# Patient Record
Sex: MALE | Race: OTHER | NOT HISPANIC OR LATINO | Employment: UNEMPLOYED | URBAN - METROPOLITAN AREA
[De-identification: names, ages, dates, MRNs, and addresses within clinical notes are randomized per-mention and may not be internally consistent; named-entity substitution may affect disease eponyms.]

---

## 2019-07-29 ENCOUNTER — APPOINTMENT (INPATIENT)
Dept: RADIOLOGY | Facility: HOSPITAL | Age: 49
DRG: 280 | End: 2019-07-29
Payer: COMMERCIAL

## 2019-07-29 ENCOUNTER — HOSPITAL ENCOUNTER (INPATIENT)
Facility: HOSPITAL | Age: 49
LOS: 3 days | Discharge: LEFT BEFORE DISCHARGE | DRG: 280 | End: 2019-08-01
Attending: EMERGENCY MEDICINE | Admitting: INTERNAL MEDICINE
Payer: COMMERCIAL

## 2019-07-29 ENCOUNTER — APPOINTMENT (EMERGENCY)
Dept: CT IMAGING | Facility: HOSPITAL | Age: 49
DRG: 280 | End: 2019-07-29
Payer: COMMERCIAL

## 2019-07-29 DIAGNOSIS — F10.10 ALCOHOL ABUSE: ICD-10-CM

## 2019-07-29 DIAGNOSIS — N17.9 AKI (ACUTE KIDNEY INJURY) (HCC): ICD-10-CM

## 2019-07-29 DIAGNOSIS — K70.10 ALCOHOLIC HEPATITIS: ICD-10-CM

## 2019-07-29 DIAGNOSIS — R74.01 TRANSAMINITIS: ICD-10-CM

## 2019-07-29 DIAGNOSIS — K92.0 HEMATEMESIS: ICD-10-CM

## 2019-07-29 DIAGNOSIS — E80.6 DIRECT HYPERBILIRUBINEMIA: ICD-10-CM

## 2019-07-29 DIAGNOSIS — D72.829 LEUKOCYTOSIS: ICD-10-CM

## 2019-07-29 DIAGNOSIS — K72.00 ACUTE LIVER FAILURE WITHOUT HEPATIC COMA: Primary | ICD-10-CM

## 2019-07-29 DIAGNOSIS — E72.20 HYPERAMMONEMIA (HCC): ICD-10-CM

## 2019-07-29 DIAGNOSIS — D64.9 ANEMIA, UNSPECIFIED TYPE: ICD-10-CM

## 2019-07-29 DIAGNOSIS — K92.0 HEMATEMESIS WITH NAUSEA: ICD-10-CM

## 2019-07-29 DIAGNOSIS — D68.9 COAGULOPATHY (HCC): ICD-10-CM

## 2019-07-29 DIAGNOSIS — E87.1 HYPONATREMIA: ICD-10-CM

## 2019-07-29 DIAGNOSIS — N19 RENAL FAILURE, UNSPECIFIED CHRONICITY: ICD-10-CM

## 2019-07-29 DIAGNOSIS — G93.40 ENCEPHALOPATHY: ICD-10-CM

## 2019-07-29 DIAGNOSIS — K92.0 COFFEE GROUND EMESIS: ICD-10-CM

## 2019-07-29 PROBLEM — R18.8 CIRRHOSIS OF LIVER WITH ASCITES (HCC): Status: ACTIVE | Noted: 2019-07-29

## 2019-07-29 PROBLEM — K70.30 ALCOHOLIC CIRRHOSIS (HCC): Status: ACTIVE | Noted: 2019-07-29

## 2019-07-29 PROBLEM — R06.02 SOB (SHORTNESS OF BREATH): Status: ACTIVE | Noted: 2019-07-29

## 2019-07-29 PROBLEM — R79.89 ELEVATED SERUM CREATININE: Status: ACTIVE | Noted: 2019-07-29

## 2019-07-29 PROBLEM — R55 SYNCOPE: Status: ACTIVE | Noted: 2019-07-29

## 2019-07-29 PROBLEM — K74.60 CIRRHOSIS OF LIVER WITH ASCITES (HCC): Status: ACTIVE | Noted: 2019-07-29

## 2019-07-29 LAB
ALBUMIN SERPL BCP-MCNC: 2.2 G/DL (ref 3.5–5)
ALP SERPL-CCNC: 327 U/L (ref 46–116)
ALT SERPL W P-5'-P-CCNC: 70 U/L (ref 12–78)
AMMONIA PLAS-SCNC: 73 UMOL/L (ref 11–35)
ANION GAP SERPL CALCULATED.3IONS-SCNC: 17 MMOL/L (ref 4–13)
APTT PPP: 34 SECONDS (ref 23–37)
AST SERPL W P-5'-P-CCNC: 162 U/L (ref 5–45)
BACTERIA UR QL AUTO: ABNORMAL /HPF
BASOPHILS # BLD AUTO: 0.06 THOUSANDS/ΜL (ref 0–0.1)
BASOPHILS NFR BLD AUTO: 0 % (ref 0–1)
BILIRUB DIRECT SERPL-MCNC: 39.33 MG/DL (ref 0–0.2)
BILIRUB SERPL-MCNC: 44.8 MG/DL (ref 0.2–1)
BILIRUB UR QL STRIP: ABNORMAL
BUN SERPL-MCNC: 46 MG/DL (ref 5–25)
CALCIUM SERPL-MCNC: 8 MG/DL (ref 8.3–10.1)
CHLORIDE SERPL-SCNC: 92 MMOL/L (ref 100–108)
CLARITY UR: ABNORMAL
CO2 SERPL-SCNC: 20 MMOL/L (ref 21–32)
COLOR UR: ABNORMAL
CREAT SERPL-MCNC: 4.71 MG/DL (ref 0.6–1.3)
EOSINOPHIL # BLD AUTO: 0.04 THOUSAND/ΜL (ref 0–0.61)
EOSINOPHIL NFR BLD AUTO: 0 % (ref 0–6)
ERYTHROCYTE [DISTWIDTH] IN BLOOD BY AUTOMATED COUNT: 25.6 % (ref 11.6–15.1)
ETHANOL SERPL-MCNC: <3 MG/DL (ref 0–3)
FINE GRAN CASTS URNS QL MICRO: ABNORMAL /LPF
GFR SERPL CREATININE-BSD FRML MDRD: 13 ML/MIN/1.73SQ M
GLUCOSE SERPL-MCNC: 114 MG/DL (ref 65–140)
GLUCOSE UR STRIP-MCNC: ABNORMAL MG/DL
HAV IGM SER QL: NORMAL
HBV CORE IGM SER QL: NORMAL
HBV SURFACE AG SER QL: NORMAL
HCT VFR BLD AUTO: 28 % (ref 36.5–49.3)
HCT VFR BLD AUTO: 28.8 % (ref 36.5–49.3)
HCT VFR BLD AUTO: 29.1 % (ref 36.5–49.3)
HCT VFR BLD AUTO: 32.4 % (ref 36.5–49.3)
HCT VFR BLD AUTO: 32.8 % (ref 36.5–49.3)
HCV AB SER QL: NORMAL
HGB BLD-MCNC: 10 G/DL (ref 12–17)
HGB BLD-MCNC: 10.2 G/DL (ref 12–17)
HGB BLD-MCNC: 10.2 G/DL (ref 12–17)
HGB BLD-MCNC: 11.3 G/DL (ref 12–17)
HGB BLD-MCNC: 11.4 G/DL (ref 12–17)
HGB UR QL STRIP.AUTO: ABNORMAL
HYALINE CASTS #/AREA URNS LPF: ABNORMAL /LPF
IMM GRANULOCYTES # BLD AUTO: 0.33 THOUSAND/UL (ref 0–0.2)
IMM GRANULOCYTES NFR BLD AUTO: 2 % (ref 0–2)
INR PPP: 1.25 (ref 0.84–1.19)
KETONES UR STRIP-MCNC: ABNORMAL MG/DL
LACTATE SERPL-SCNC: 2 MMOL/L (ref 0.5–2)
LEUKOCYTE ESTERASE UR QL STRIP: ABNORMAL
LIPASE SERPL-CCNC: 788 U/L (ref 73–393)
LYMPHOCYTES # BLD AUTO: 0.8 THOUSANDS/ΜL (ref 0.6–4.47)
LYMPHOCYTES NFR BLD AUTO: 4 % (ref 14–44)
MCH RBC QN AUTO: 33.7 PG (ref 26.8–34.3)
MCHC RBC AUTO-ENTMCNC: 34.8 G/DL (ref 31.4–37.4)
MCV RBC AUTO: 97 FL (ref 82–98)
MONOCYTES # BLD AUTO: 1.37 THOUSAND/ΜL (ref 0.17–1.22)
MONOCYTES NFR BLD AUTO: 6 % (ref 4–12)
NEUTROPHILS # BLD AUTO: 20.11 THOUSANDS/ΜL (ref 1.85–7.62)
NEUTS SEG NFR BLD AUTO: 88 % (ref 43–75)
NITRITE UR QL STRIP: NEGATIVE
NON-SQ EPI CELLS URNS QL MICRO: ABNORMAL /HPF
NRBC BLD AUTO-RTO: 0 /100 WBCS
OSMOLALITY UR/SERPL-RTO: 281 MMOL/KG (ref 282–298)
PH UR STRIP.AUTO: 5 [PH]
PLATELET # BLD AUTO: 275 THOUSANDS/UL (ref 149–390)
PMV BLD AUTO: 11.3 FL (ref 8.9–12.7)
POTASSIUM SERPL-SCNC: 3.8 MMOL/L (ref 3.5–5.3)
PROT SERPL-MCNC: 6.1 G/DL (ref 6.4–8.2)
PROT UR STRIP-MCNC: ABNORMAL MG/DL
PROTHROMBIN TIME: 15.7 SECONDS (ref 11.6–14.5)
RBC # BLD AUTO: 3.38 MILLION/UL (ref 3.88–5.62)
RBC #/AREA URNS AUTO: ABNORMAL /HPF
SODIUM SERPL-SCNC: 129 MMOL/L (ref 136–145)
SP GR UR STRIP.AUTO: 1.02 (ref 1–1.03)
UROBILINOGEN UR QL STRIP.AUTO: 2 E.U./DL
WBC # BLD AUTO: 22.71 THOUSAND/UL (ref 4.31–10.16)
WBC #/AREA URNS AUTO: ABNORMAL /HPF

## 2019-07-29 PROCEDURE — 93005 ELECTROCARDIOGRAM TRACING: CPT

## 2019-07-29 PROCEDURE — C9113 INJ PANTOPRAZOLE SODIUM, VIA: HCPCS | Performed by: EMERGENCY MEDICINE

## 2019-07-29 PROCEDURE — 83690 ASSAY OF LIPASE: CPT | Performed by: EMERGENCY MEDICINE

## 2019-07-29 PROCEDURE — 84300 ASSAY OF URINE SODIUM: CPT | Performed by: PHYSICIAN ASSISTANT

## 2019-07-29 PROCEDURE — 99285 EMERGENCY DEPT VISIT HI MDM: CPT | Performed by: EMERGENCY MEDICINE

## 2019-07-29 PROCEDURE — 87205 SMEAR GRAM STAIN: CPT | Performed by: INTERNAL MEDICINE

## 2019-07-29 PROCEDURE — 80053 COMPREHEN METABOLIC PANEL: CPT | Performed by: EMERGENCY MEDICINE

## 2019-07-29 PROCEDURE — 94762 N-INVAS EAR/PLS OXIMTRY CONT: CPT

## 2019-07-29 PROCEDURE — 83605 ASSAY OF LACTIC ACID: CPT | Performed by: EMERGENCY MEDICINE

## 2019-07-29 PROCEDURE — 82043 UR ALBUMIN QUANTITATIVE: CPT | Performed by: INTERNAL MEDICINE

## 2019-07-29 PROCEDURE — 83935 ASSAY OF URINE OSMOLALITY: CPT | Performed by: PHYSICIAN ASSISTANT

## 2019-07-29 PROCEDURE — 85610 PROTHROMBIN TIME: CPT | Performed by: EMERGENCY MEDICINE

## 2019-07-29 PROCEDURE — 96365 THER/PROPH/DIAG IV INF INIT: CPT

## 2019-07-29 PROCEDURE — 99285 EMERGENCY DEPT VISIT HI MDM: CPT

## 2019-07-29 PROCEDURE — 85025 COMPLETE CBC W/AUTO DIFF WBC: CPT | Performed by: EMERGENCY MEDICINE

## 2019-07-29 PROCEDURE — 99255 IP/OBS CONSLTJ NEW/EST HI 80: CPT | Performed by: INTERNAL MEDICINE

## 2019-07-29 PROCEDURE — C9113 INJ PANTOPRAZOLE SODIUM, VIA: HCPCS | Performed by: PHYSICIAN ASSISTANT

## 2019-07-29 PROCEDURE — 81001 URINALYSIS AUTO W/SCOPE: CPT | Performed by: INTERNAL MEDICINE

## 2019-07-29 PROCEDURE — 82436 ASSAY OF URINE CHLORIDE: CPT | Performed by: INTERNAL MEDICINE

## 2019-07-29 PROCEDURE — 83930 ASSAY OF BLOOD OSMOLALITY: CPT | Performed by: PHYSICIAN ASSISTANT

## 2019-07-29 PROCEDURE — 99223 1ST HOSP IP/OBS HIGH 75: CPT | Performed by: INTERNAL MEDICINE

## 2019-07-29 PROCEDURE — 82570 ASSAY OF URINE CREATININE: CPT | Performed by: INTERNAL MEDICINE

## 2019-07-29 PROCEDURE — 82248 BILIRUBIN DIRECT: CPT | Performed by: EMERGENCY MEDICINE

## 2019-07-29 PROCEDURE — 85014 HEMATOCRIT: CPT | Performed by: PHYSICIAN ASSISTANT

## 2019-07-29 PROCEDURE — 96375 TX/PRO/DX INJ NEW DRUG ADDON: CPT

## 2019-07-29 PROCEDURE — 36415 COLL VENOUS BLD VENIPUNCTURE: CPT | Performed by: EMERGENCY MEDICINE

## 2019-07-29 PROCEDURE — 80074 ACUTE HEPATITIS PANEL: CPT | Performed by: PHYSICIAN ASSISTANT

## 2019-07-29 PROCEDURE — 87040 BLOOD CULTURE FOR BACTERIA: CPT | Performed by: PHYSICIAN ASSISTANT

## 2019-07-29 PROCEDURE — 96376 TX/PRO/DX INJ SAME DRUG ADON: CPT

## 2019-07-29 PROCEDURE — 74022 RADEX COMPL AQT ABD SERIES: CPT

## 2019-07-29 PROCEDURE — 85018 HEMOGLOBIN: CPT | Performed by: PHYSICIAN ASSISTANT

## 2019-07-29 PROCEDURE — 80320 DRUG SCREEN QUANTALCOHOLS: CPT | Performed by: EMERGENCY MEDICINE

## 2019-07-29 PROCEDURE — 82140 ASSAY OF AMMONIA: CPT | Performed by: EMERGENCY MEDICINE

## 2019-07-29 PROCEDURE — 71045 X-RAY EXAM CHEST 1 VIEW: CPT

## 2019-07-29 PROCEDURE — 99222 1ST HOSP IP/OBS MODERATE 55: CPT | Performed by: INTERNAL MEDICINE

## 2019-07-29 PROCEDURE — 85730 THROMBOPLASTIN TIME PARTIAL: CPT | Performed by: EMERGENCY MEDICINE

## 2019-07-29 PROCEDURE — 74176 CT ABD & PELVIS W/O CONTRAST: CPT

## 2019-07-29 RX ORDER — MAGNESIUM HYDROXIDE/ALUMINUM HYDROXICE/SIMETHICONE 120; 1200; 1200 MG/30ML; MG/30ML; MG/30ML
30 SUSPENSION ORAL EVERY 6 HOURS PRN
Status: DISCONTINUED | OUTPATIENT
Start: 2019-07-29 | End: 2019-07-29

## 2019-07-29 RX ORDER — LACTULOSE 20 G/30ML
20 SOLUTION ORAL 2 TIMES DAILY
Status: DISCONTINUED | OUTPATIENT
Start: 2019-07-29 | End: 2019-07-29

## 2019-07-29 RX ORDER — ONDANSETRON 2 MG/ML
4 INJECTION INTRAMUSCULAR; INTRAVENOUS ONCE
Status: COMPLETED | OUTPATIENT
Start: 2019-07-29 | End: 2019-07-29

## 2019-07-29 RX ORDER — ONDANSETRON 2 MG/ML
4 INJECTION INTRAMUSCULAR; INTRAVENOUS EVERY 6 HOURS PRN
Status: DISCONTINUED | OUTPATIENT
Start: 2019-07-29 | End: 2019-08-01 | Stop reason: HOSPADM

## 2019-07-29 RX ORDER — FLUTICASONE PROPIONATE 50 MCG
1 SPRAY, SUSPENSION (ML) NASAL
Status: DISCONTINUED | OUTPATIENT
Start: 2019-07-29 | End: 2019-08-01 | Stop reason: HOSPADM

## 2019-07-29 RX ORDER — ALBUMIN (HUMAN) 12.5 G/50ML
25 SOLUTION INTRAVENOUS 3 TIMES DAILY
Status: COMPLETED | OUTPATIENT
Start: 2019-07-29 | End: 2019-08-01

## 2019-07-29 RX ORDER — HYDROMORPHONE HCL/PF 1 MG/ML
0.5 SYRINGE (ML) INJECTION ONCE
Status: DISCONTINUED | OUTPATIENT
Start: 2019-07-29 | End: 2019-07-29

## 2019-07-29 RX ORDER — LANOLIN ALCOHOL/MO/W.PET/CERES
6 CREAM (GRAM) TOPICAL
Status: DISCONTINUED | OUTPATIENT
Start: 2019-07-29 | End: 2019-08-01 | Stop reason: HOSPADM

## 2019-07-29 RX ORDER — HYDROMORPHONE HCL/PF 1 MG/ML
0.5 SYRINGE (ML) INJECTION ONCE
Status: COMPLETED | OUTPATIENT
Start: 2019-07-29 | End: 2019-07-29

## 2019-07-29 RX ORDER — NICOTINE 21 MG/24HR
1 PATCH, TRANSDERMAL 24 HOURS TRANSDERMAL DAILY
Status: DISCONTINUED | OUTPATIENT
Start: 2019-07-29 | End: 2019-08-01 | Stop reason: HOSPADM

## 2019-07-29 RX ORDER — HYDROMORPHONE HCL/PF 1 MG/ML
0.2 SYRINGE (ML) INJECTION ONCE
Status: COMPLETED | OUTPATIENT
Start: 2019-07-29 | End: 2019-07-29

## 2019-07-29 RX ORDER — FENTANYL CITRATE 50 UG/ML
25 INJECTION, SOLUTION INTRAMUSCULAR; INTRAVENOUS ONCE
Status: COMPLETED | OUTPATIENT
Start: 2019-07-29 | End: 2019-07-29

## 2019-07-29 RX ORDER — SODIUM CHLORIDE 9 MG/ML
75 INJECTION, SOLUTION INTRAVENOUS CONTINUOUS
Status: DISCONTINUED | OUTPATIENT
Start: 2019-07-29 | End: 2019-08-01 | Stop reason: HOSPADM

## 2019-07-29 RX ORDER — ACETAMINOPHEN 325 MG/1
650 TABLET ORAL EVERY 6 HOURS PRN
Status: DISCONTINUED | OUTPATIENT
Start: 2019-07-29 | End: 2019-07-29

## 2019-07-29 RX ORDER — SPIRONOLACTONE 25 MG/1
25 TABLET ORAL DAILY
Status: DISCONTINUED | OUTPATIENT
Start: 2019-07-29 | End: 2019-07-29

## 2019-07-29 RX ADMIN — LACTULOSE 20 G: 10 SOLUTION ORAL at 09:31

## 2019-07-29 RX ADMIN — ALBUMIN (HUMAN) 25 G: 12.5 SOLUTION INTRAVENOUS at 16:48

## 2019-07-29 RX ADMIN — MELATONIN 6 MG: 3 TAB ORAL at 21:41

## 2019-07-29 RX ADMIN — SODIUM CHLORIDE 8 MG/HR: 9 INJECTION, SOLUTION INTRAVENOUS at 05:05

## 2019-07-29 RX ADMIN — CEFTRIAXONE SODIUM 1000 MG: 10 INJECTION, POWDER, FOR SOLUTION INTRAVENOUS at 04:39

## 2019-07-29 RX ADMIN — FENTANYL CITRATE 25 MCG: 50 INJECTION INTRAMUSCULAR; INTRAVENOUS at 02:10

## 2019-07-29 RX ADMIN — METRONIDAZOLE 500 MG: 500 INJECTION, SOLUTION INTRAVENOUS at 04:05

## 2019-07-29 RX ADMIN — SODIUM CHLORIDE 150 ML/HR: 0.9 INJECTION, SOLUTION INTRAVENOUS at 19:52

## 2019-07-29 RX ADMIN — SODIUM CHLORIDE 80 MG: 9 INJECTION, SOLUTION INTRAVENOUS at 02:23

## 2019-07-29 RX ADMIN — SODIUM CHLORIDE 8 MG/HR: 9 INJECTION, SOLUTION INTRAVENOUS at 13:11

## 2019-07-29 RX ADMIN — ALBUMIN (HUMAN) 25 G: 12.5 SOLUTION INTRAVENOUS at 21:38

## 2019-07-29 RX ADMIN — ONDANSETRON 4 MG: 2 INJECTION INTRAMUSCULAR; INTRAVENOUS at 02:47

## 2019-07-29 RX ADMIN — FOLIC ACID: 5 INJECTION, SOLUTION INTRAMUSCULAR; INTRAVENOUS; SUBCUTANEOUS at 13:19

## 2019-07-29 RX ADMIN — SODIUM CHLORIDE 150 ML/HR: 0.9 INJECTION, SOLUTION INTRAVENOUS at 11:49

## 2019-07-29 RX ADMIN — NICOTINE 1 PATCH: 21 PATCH TRANSDERMAL at 09:31

## 2019-07-29 RX ADMIN — FLUTICASONE PROPIONATE 1 SPRAY: 50 SPRAY, METERED NASAL at 22:10

## 2019-07-29 RX ADMIN — HYDROMORPHONE HYDROCHLORIDE 0.5 MG: 1 INJECTION, SOLUTION INTRAMUSCULAR; INTRAVENOUS; SUBCUTANEOUS at 05:25

## 2019-07-29 RX ADMIN — HYDROMORPHONE HYDROCHLORIDE 0.2 MG: 1 INJECTION, SOLUTION INTRAMUSCULAR; INTRAVENOUS; SUBCUTANEOUS at 18:24

## 2019-07-29 RX ADMIN — VANCOMYCIN HYDROCHLORIDE 1500 MG: 1 INJECTION, POWDER, LYOPHILIZED, FOR SOLUTION INTRAVENOUS at 05:06

## 2019-07-29 RX ADMIN — FENTANYL CITRATE 25 MCG: 50 INJECTION INTRAMUSCULAR; INTRAVENOUS at 02:47

## 2019-07-29 NOTE — ASSESSMENT & PLAN NOTE
· Creatinine on admission 4 71   No previous results to compare to  · Likely 2/2 hepatorenal syndrome in conjunction with dehydration/poor oral intake/GI bleed  · Avoid nephrotoxic agents and hypotension  · Repeat BMP in am  · Nephrology consult

## 2019-07-29 NOTE — ED NOTES
Pt placed on hold bed at this time, pt given new blankets, given new gown  Pt has no current complaints       Fratnz Smith RN  07/29/19 8100

## 2019-07-29 NOTE — ED NOTES
Patient transported to [facility] Katerina Diallo RN  07/29/19 1451

## 2019-07-29 NOTE — ED NOTES
1  CC  Liver cirrhosis, jaundice  2  Medications/drip protonix gtt  3  Abnormal labs/vitals/assessment ciwa  4  Medications/drips  5  Last time narcotics given n/a  6  IV/drains/ etc   20gLAC  7  Isolation status N/A  8  Skin [id number]  Ambulation status SELF  10   Phone [id number]  11 trauma y/n     Carol Henson RN  07/29/19 3340

## 2019-07-29 NOTE — ASSESSMENT & PLAN NOTE
· In the setting of alcoholic cirrhosis  · Ammonia on admission 73  · Start Lactulose PO BID  · Recheck ammonia level in am

## 2019-07-29 NOTE — QUICK NOTE
Critical Care Medicine:  Triage [facility] [place] [facility]  ED 26/ED 26  07/29/19    Called to evaluate patient for possible step-down admission  Patient presented to the emergency room earlier this evening with shortness of breath, ascites, and significant jaundice secondary to stage IV liver disease  Patient with a few episodes of coffee-ground emesis in the emergency room  Patient had signed out from [facility] a couple days ago  Patient states that his reasoning was frequent lab draws, downgraded from ICU, and the pillows smell like dead people    Patient seems to have poor insight to his disease process, unable to tell me if he is on any lactulose or rifaximin  He does relate a several year history of gastritis  Patient poor historian and providing several different social history is involving his alcohol use, stating his last drink was anywhere from 2-5 months ago  Patient refusing the majority of physical examination, however will allow me to assess his abdomen  It is distended, firm, hypoactive bowel sounds  Denies tenderness of any kind on palpation  Significant labs include leukocytosis with left shift, hemoglobin of 11 4, no thrombocytopenia  AST and alkaline phosphatase mildly elevated  Lactate normal   Lipase elevated at 788  Total bilirubin 44 8 with a direct of 39 33  Creatinine 4 71 with a BUN of 46  He is hyponatremic at 1:29 a m , unclear if related to beer potomania  Hypoalbuminemia 2 2  Blood pressure 122/73, pulse 100, temperature 98 2 °F (36 8 °C), temperature source Oral, resp  rate 19, height 6' 6" (1 981 m), weight 106 kg (233 lb), SpO2 95 %  MELD score 37  Vital signs stable, hemodynamically stable at present  Patient stable for admission to the medical-surgical unit on tele  Would suggest the following:   Albumin for fluid resuscitation given his hypoalbuminemia, monitor and replete electrolytes as needed month, right upper quadrant ultrasound, CT abdomen pelvis rule out gallstone pancreatitis versus congestion  SBP prophylaxis ceftriaxone and Flagyl with Vanco due to recent hospitalization  Serial hemoglobins, Protonix drip  Nephrology and gastroenterology consult in the morning  As always, please feel free to contact critical care medicine if we are needed or there are any questions      Time TREY Patten
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
## 2019-07-29 NOTE — SOCIAL WORK
Cm met with patient at bedside, patient alert and oriented during interview  Patient reports residing in a private home with his [address]  Patient mentioned he does not have children, was  however  16 years ago, his mother passed away 21 years ago and does not have contact with his father, also his only sibling passed way this January she had leukemia   Patient denies any home o2, reports being completely independent with ADL's, has a car that he does not drive  Patient confirmed his address was [address]   Patient mentioned he does not work or collect [address]  Patient mentioned his room-mate pays all the bills in the home and provides the tolietries  Patient mentioned that he has people in the home everyday who "chip in" for beer  Patient mentioned his first encounter with drugs was at age 5, he first used THC, then at 15 years he started to use crack cocaine, patient mentioned he was raised in [place], [place] and continues experimenting with different drugs  Patient confirmed this past Saturday he consumed 12 cans of 12oz beer in one day  Patient denied any triggers he just mentioned he liked to drink  Cm offered DA/SA resources reviewed both inpatient and outpatient  Patient declined, stated he does not need those services, mentioned he is able to stop when he needs to  Cm encouraged patient to follow up with cm team in the event he wanted to explore those services  Patient denies any MH/SI or HI , mentions he does not go to the doctor as recommended  He stated he saw Dr Hua John one time and wants another provider upon discharge  [facility]  Patient also mentioned Amisha Adrian can make medical decisions on his behalf if needed, he does not have any family, if he has does they are in [place] and he does not have any contact with them      Cm team will continue to follow on medsurg and assess for needs  CM reviewed discharge planning process including the following: identifying help at home, patient preference for discharge planning needs, pharmacy preference, and availability of treatment team to discuss questions or concerns patient and/or family may have regarding understanding medications and recognizing signs and symptoms once discharged  CM also encouraged patient to follow up with all recommended appointments after discharge  Patient advised of importance for patient and family to participate in managing patients medical well being

## 2019-07-29 NOTE — ED NOTES
Pt states he is too uncomfortable at this time to complete orthostatic vital signs      Kathy Saini RN  07/29/19 1869

## 2019-07-29 NOTE — ED NOTES
Reached out to [facility] with [facility] to clarify patient's prn pain medication orders  Pt is currently experiencing 10/10 pain   Provider aware      Logan Solis RN  07/29/19 2011

## 2019-07-29 NOTE — QUICK NOTE
Currently rounding on 4th floor approach by RN regarding patient having significant increased pain that is intolerable  Nothing currently ordered p r n  For pain will give a 1 time dose of IV hydromorphone 0 2 mg and will address with attending  Personal discussion with palliative care would caution against opioids given his inability to metabolize medications and further low threshold for encephalopathy  Again will continue with the 1 time dose and proceed cautiously with further pain medication

## 2019-07-29 NOTE — PLAN OF CARE
Problem: PAIN - ADULT  Goal: Verbalizes/displays adequate comfort level or baseline comfort level  Description  Interventions:  - Encourage patient to monitor pain and request assistance  - Assess pain using appropriate pain scale  - Administer analgesics based on type and severity of pain and evaluate response  - Implement non-pharmacological measures as appropriate and evaluate response  - Consider cultural and social influences on pain and pain management  - Notify physician/advanced practitioner if interventions unsuccessful or patient reports new pain  Outcome: Progressing     Problem: SAFETY ADULT  Goal: Patient will remain free of falls  Description  INTERVENTIONS:  - Assess patient frequently for physical needs  -  Identify cognitive and physical deficits and behaviors that affect risk of falls    -  [place] fall precautions as indicated by assessment   - Educate patient/family on patient safety including physical limitations  - Instruct patient to call for assistance with activity based on assessment  - Modify environment to reduce risk of injury  - Consider OT/PT consult to assist with strengthening/mobility  Outcome: Progressing  Goal: Maintain or return to baseline ADL function  Description  INTERVENTIONS:  -  Assess patient's ability to carry out ADLs; assess patient's baseline for ADL function and identify physical deficits which impact ability to perform ADLs (bathing, care of mouth/teeth, toileting, grooming, dressing, etc )  - Assess/evaluate cause of self-care deficits   - Assess range of motion  - Assess patient's mobility; develop plan if impaired  - Assess patient's need for assistive devices and provide as appropriate  - Encourage maximum independence but intervene and supervise when necessary  ¯ Involve family in performance of ADLs  ¯ Assess for home care needs following discharge   ¯ Request OT consult to assist with ADL evaluation and planning for discharge  ¯ Provide patient education as appropriate  Outcome: Progressing     Problem: DISCHARGE PLANNING  Goal: Discharge to home or other facility with appropriate resources  Description  INTERVENTIONS:  - Identify barriers to discharge w/patient and caregiver  - Arrange for needed discharge resources and transportation as appropriate  - Identify discharge learning needs (meds, wound care, etc )  - Arrange for interpretive services to assist at discharge as needed  - Refer to Case Management Department for coordinating discharge planning if the patient needs post-hospital services based on physician/advanced practitioner order or complex needs related to functional status, cognitive ability, or social support system  Outcome: Progressing     Problem: Knowledge Deficit  Goal: Patient/family/caregiver demonstrates understanding of disease process, treatment plan, medications, and discharge instructions  Description  Complete learning assessment and assess knowledge base    Interventions:  - Provide teaching at level of understanding  - Provide teaching via preferred learning methods  Outcome: Progressing

## 2019-07-29 NOTE — ASSESSMENT & PLAN NOTE
· May be 2/2 fluid-volume status in the setting of liver failure  · Check urine Na/Osmo and serum osmo  · Nephrology consult as above

## 2019-07-29 NOTE — ASSESSMENT & PLAN NOTE
· Reports multiple syncopal episodes over the past 5 months  Most recent yesterday  States "I woke up on the floor and everything looked strange " Does report dizziness upon standing  · BP soft   Likely vaso-vagal  Will check orthostatic vitals  · May require further work-up to r/o cardiac etiology
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
## 2019-07-29 NOTE — ASSESSMENT & PLAN NOTE
· Reportedly drank 1 liter of vodka daily up until 5 months ago when he "went on probation " Since then has become jaundiced with decreased appetite 2/2 N/V  Recently seen at [facility] but left AMA after 2 weeks  Not clear about what work-up was done or findings  Now with increasing SOB and coffee-ground emesis  · Start Aldactone and Protonix gtt  · GI consult  · NPO  · CT abdomen completed in ED revealed fatty enlarged liver with ascites    · Will check hepatitis panel
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
## 2019-07-30 ENCOUNTER — APPOINTMENT (INPATIENT)
Dept: DIALYSIS | Facility: HOSPITAL | Age: 49
DRG: 280 | End: 2019-07-30
Payer: COMMERCIAL

## 2019-07-30 ENCOUNTER — APPOINTMENT (INPATIENT)
Dept: RADIOLOGY | Facility: HOSPITAL | Age: 49
DRG: 280 | End: 2019-07-30
Payer: COMMERCIAL

## 2019-07-30 ENCOUNTER — APPOINTMENT (INPATIENT)
Dept: ULTRASOUND IMAGING | Facility: HOSPITAL | Age: 49
DRG: 280 | End: 2019-07-30
Payer: COMMERCIAL

## 2019-07-30 PROBLEM — N17.9 AKI (ACUTE KIDNEY INJURY) (HCC): Status: ACTIVE | Noted: 2019-07-29

## 2019-07-30 PROBLEM — G93.40 ENCEPHALOPATHY: Status: ACTIVE | Noted: 2019-07-30

## 2019-07-30 PROBLEM — D72.829 LEUKOCYTOSIS: Status: ACTIVE | Noted: 2019-07-30

## 2019-07-30 LAB
25(OH)D3 SERPL-MCNC: 8.5 NG/ML (ref 30–100)
ALBUMIN SERPL BCP-MCNC: 2.5 G/DL (ref 3.5–5)
ALP SERPL-CCNC: 283 U/L (ref 46–116)
ALT SERPL W P-5'-P-CCNC: 56 U/L (ref 12–78)
AMMONIA PLAS-SCNC: 102 UMOL/L (ref 11–35)
ANION GAP SERPL CALCULATED.3IONS-SCNC: 21 MMOL/L (ref 4–13)
ANISOCYTOSIS BLD QL SMEAR: PRESENT
AST SERPL W P-5'-P-CCNC: 160 U/L (ref 5–45)
BASOPHILS # BLD MANUAL: 0.19 THOUSAND/UL (ref 0–0.1)
BASOPHILS NFR MAR MANUAL: 1 % (ref 0–1)
BILIRUB SERPL-MCNC: 44.8 MG/DL (ref 0.2–1)
BUN SERPL-MCNC: 67 MG/DL (ref 5–25)
CALCIUM SERPL-MCNC: 7.2 MG/DL (ref 8.3–10.1)
CHLORIDE SERPL-SCNC: 95 MMOL/L (ref 100–108)
CHLORIDE UR-SCNC: 11 MMOL/L
CK SERPL-CCNC: 75 U/L (ref 39–308)
CO2 SERPL-SCNC: 15 MMOL/L (ref 21–32)
CREAT SERPL-MCNC: 7.74 MG/DL (ref 0.6–1.3)
CREAT UR-MCNC: 181 MG/DL
EOSINOPHIL # BLD MANUAL: 0.19 THOUSAND/UL (ref 0–0.4)
EOSINOPHIL NFR BLD MANUAL: 1 % (ref 0–6)
EOSINOPHIL NFR URNS MANUAL: 0 %
ERYTHROCYTE [DISTWIDTH] IN BLOOD BY AUTOMATED COUNT: 25.3 % (ref 11.6–15.1)
FERRITIN SERPL-MCNC: 394 NG/ML (ref 8–388)
GFR SERPL CREATININE-BSD FRML MDRD: 7 ML/MIN/1.73SQ M
GLUCOSE SERPL-MCNC: 94 MG/DL (ref 65–140)
HCT VFR BLD AUTO: 27.3 % (ref 36.5–49.3)
HCT VFR BLD AUTO: 28.6 % (ref 36.5–49.3)
HGB BLD-MCNC: 10.1 G/DL (ref 12–17)
HGB BLD-MCNC: 10.2 G/DL (ref 12–17)
INR PPP: 1.48 (ref 0.84–1.19)
IRON SATN MFR SERPL: 46 %
IRON SERPL-MCNC: 39 UG/DL (ref 65–175)
LG PLATELETS BLD QL SMEAR: PRESENT
LYMPHOCYTES # BLD AUTO: 1.14 THOUSAND/UL (ref 0.6–4.47)
LYMPHOCYTES # BLD AUTO: 6 % (ref 14–44)
MAGNESIUM SERPL-MCNC: 2.2 MG/DL (ref 1.6–2.6)
MCH RBC QN AUTO: 35.1 PG (ref 26.8–34.3)
MCHC RBC AUTO-ENTMCNC: 35.7 G/DL (ref 31.4–37.4)
MCV RBC AUTO: 98 FL (ref 82–98)
METAMYELOCYTES NFR BLD MANUAL: 1 % (ref 0–1)
MICROALBUMIN UR-MCNC: 141 MG/L (ref 0–20)
MICROALBUMIN/CREAT 24H UR: 78 MG/G CREATININE (ref 0–30)
MONOCYTES # BLD AUTO: 0.95 THOUSAND/UL (ref 0–1.22)
MONOCYTES NFR BLD: 5 % (ref 4–12)
NEUTROPHILS # BLD MANUAL: 16.1 THOUSAND/UL (ref 1.85–7.62)
NEUTS BAND NFR BLD MANUAL: 5 % (ref 0–8)
NEUTS SEG NFR BLD AUTO: 80 % (ref 43–75)
NRBC BLD AUTO-RTO: 0 /100 WBCS
OSMOLALITY UR: 336 MMOL/KG
PHOSPHATE SERPL-MCNC: 6.8 MG/DL (ref 2.7–4.5)
PLATELET # BLD AUTO: 228 THOUSANDS/UL (ref 149–390)
PLATELET BLD QL SMEAR: ADEQUATE
PMV BLD AUTO: 11.9 FL (ref 8.9–12.7)
POLYCHROMASIA BLD QL SMEAR: PRESENT
POTASSIUM SERPL-SCNC: 3.8 MMOL/L (ref 3.5–5.3)
PROT SERPL-MCNC: 5.4 G/DL (ref 6.4–8.2)
PROTHROMBIN TIME: 18 SECONDS (ref 11.6–14.5)
PTH-INTACT SERPL-MCNC: 327.3 PG/ML (ref 18.4–80.1)
RBC # BLD AUTO: 2.91 MILLION/UL (ref 3.88–5.62)
SODIUM 24H UR-SCNC: 12 MOL/L
SODIUM SERPL-SCNC: 131 MMOL/L (ref 136–145)
T4 FREE SERPL-MCNC: 1.34 NG/DL (ref 0.76–1.46)
TIBC SERPL-MCNC: 85 UG/DL (ref 250–450)
TOTAL CELLS COUNTED SPEC: 100
TSH SERPL DL<=0.05 MIU/L-ACNC: 0.19 UIU/ML (ref 0.36–3.74)
URATE SERPL-MCNC: 11 MG/DL (ref 4.2–8)
VARIANT LYMPHS # BLD AUTO: 1 %
WBC # BLD AUTO: 18.94 THOUSAND/UL (ref 4.31–10.16)

## 2019-07-30 PROCEDURE — 99232 SBSQ HOSP IP/OBS MODERATE 35: CPT | Performed by: INTERNAL MEDICINE

## 2019-07-30 PROCEDURE — 84439 ASSAY OF FREE THYROXINE: CPT | Performed by: PHYSICIAN ASSISTANT

## 2019-07-30 PROCEDURE — C9113 INJ PANTOPRAZOLE SODIUM, VIA: HCPCS | Performed by: PHYSICIAN ASSISTANT

## 2019-07-30 PROCEDURE — 85610 PROTHROMBIN TIME: CPT | Performed by: PHYSICIAN ASSISTANT

## 2019-07-30 PROCEDURE — 99233 SBSQ HOSP IP/OBS HIGH 50: CPT | Performed by: INTERNAL MEDICINE

## 2019-07-30 PROCEDURE — 84550 ASSAY OF BLOOD/URIC ACID: CPT | Performed by: INTERNAL MEDICINE

## 2019-07-30 PROCEDURE — 82306 VITAMIN D 25 HYDROXY: CPT | Performed by: INTERNAL MEDICINE

## 2019-07-30 PROCEDURE — 83735 ASSAY OF MAGNESIUM: CPT | Performed by: PHYSICIAN ASSISTANT

## 2019-07-30 PROCEDURE — 84100 ASSAY OF PHOSPHORUS: CPT | Performed by: INTERNAL MEDICINE

## 2019-07-30 PROCEDURE — 84443 ASSAY THYROID STIM HORMONE: CPT | Performed by: PHYSICIAN ASSISTANT

## 2019-07-30 PROCEDURE — 94762 N-INVAS EAR/PLS OXIMTRY CONT: CPT

## 2019-07-30 PROCEDURE — 83540 ASSAY OF IRON: CPT | Performed by: INTERNAL MEDICINE

## 2019-07-30 PROCEDURE — 82140 ASSAY OF AMMONIA: CPT | Performed by: PHYSICIAN ASSISTANT

## 2019-07-30 PROCEDURE — NC001 PR NO CHARGE: Performed by: NURSE PRACTITIONER

## 2019-07-30 PROCEDURE — 82550 ASSAY OF CK (CPK): CPT | Performed by: NURSE PRACTITIONER

## 2019-07-30 PROCEDURE — 02HV33Z INSERTION OF INFUSION DEVICE INTO SUPERIOR VENA CAVA, PERCUTANEOUS APPROACH: ICD-10-PCS | Performed by: STUDENT IN AN ORGANIZED HEALTH CARE EDUCATION/TRAINING PROGRAM

## 2019-07-30 PROCEDURE — C9113 INJ PANTOPRAZOLE SODIUM, VIA: HCPCS | Performed by: NURSE PRACTITIONER

## 2019-07-30 PROCEDURE — 83550 IRON BINDING TEST: CPT | Performed by: INTERNAL MEDICINE

## 2019-07-30 PROCEDURE — 82728 ASSAY OF FERRITIN: CPT | Performed by: INTERNAL MEDICINE

## 2019-07-30 PROCEDURE — NC001 PR NO CHARGE: Performed by: INTERNAL MEDICINE

## 2019-07-30 PROCEDURE — 76700 US EXAM ABDOM COMPLETE: CPT

## 2019-07-30 PROCEDURE — 85027 COMPLETE CBC AUTOMATED: CPT | Performed by: PHYSICIAN ASSISTANT

## 2019-07-30 PROCEDURE — 85014 HEMATOCRIT: CPT | Performed by: NURSE PRACTITIONER

## 2019-07-30 PROCEDURE — 71045 X-RAY EXAM CHEST 1 VIEW: CPT

## 2019-07-30 PROCEDURE — 99233 SBSQ HOSP IP/OBS HIGH 50: CPT | Performed by: NURSE PRACTITIONER

## 2019-07-30 PROCEDURE — 80053 COMPREHEN METABOLIC PANEL: CPT | Performed by: PHYSICIAN ASSISTANT

## 2019-07-30 PROCEDURE — 99232 SBSQ HOSP IP/OBS MODERATE 35: CPT | Performed by: STUDENT IN AN ORGANIZED HEALTH CARE EDUCATION/TRAINING PROGRAM

## 2019-07-30 PROCEDURE — 36556 INSERT NON-TUNNEL CV CATH: CPT | Performed by: NURSE PRACTITIONER

## 2019-07-30 PROCEDURE — 85018 HEMOGLOBIN: CPT | Performed by: NURSE PRACTITIONER

## 2019-07-30 PROCEDURE — 83970 ASSAY OF PARATHORMONE: CPT | Performed by: INTERNAL MEDICINE

## 2019-07-30 PROCEDURE — 5A1D70Z PERFORMANCE OF URINARY FILTRATION, INTERMITTENT, LESS THAN 6 HOURS PER DAY: ICD-10-PCS | Performed by: STUDENT IN AN ORGANIZED HEALTH CARE EDUCATION/TRAINING PROGRAM

## 2019-07-30 PROCEDURE — 85007 BL SMEAR W/DIFF WBC COUNT: CPT | Performed by: PHYSICIAN ASSISTANT

## 2019-07-30 RX ORDER — PANTOPRAZOLE SODIUM 40 MG/1
40 INJECTION, POWDER, FOR SOLUTION INTRAVENOUS EVERY 12 HOURS
Status: DISCONTINUED | OUTPATIENT
Start: 2019-07-30 | End: 2019-08-01

## 2019-07-30 RX ORDER — LACTULOSE 20 G/30ML
30 SOLUTION ORAL 2 TIMES DAILY
Status: DISCONTINUED | OUTPATIENT
Start: 2019-07-30 | End: 2019-07-30

## 2019-07-30 RX ORDER — PREDNISOLONE SODIUM PHOSPHATE 15 MG/5ML
40 SOLUTION ORAL DAILY
Status: DISCONTINUED | OUTPATIENT
Start: 2019-07-30 | End: 2019-08-01 | Stop reason: HOSPADM

## 2019-07-30 RX ORDER — SIMETHICONE 20 MG/.3ML
40 EMULSION ORAL EVERY 6 HOURS PRN
Status: DISCONTINUED | OUTPATIENT
Start: 2019-07-30 | End: 2019-08-01 | Stop reason: HOSPADM

## 2019-07-30 RX ORDER — ACETAMINOPHEN 325 MG/1
650 TABLET ORAL EVERY 8 HOURS PRN
Status: DISCONTINUED | OUTPATIENT
Start: 2019-07-30 | End: 2019-08-01 | Stop reason: HOSPADM

## 2019-07-30 RX ORDER — CHLORHEXIDINE GLUCONATE 0.12 MG/ML
15 RINSE ORAL EVERY 12 HOURS SCHEDULED
Status: DISCONTINUED | OUTPATIENT
Start: 2019-07-30 | End: 2019-07-31

## 2019-07-30 RX ORDER — LACTULOSE 20 G/30ML
30 SOLUTION ORAL 3 TIMES DAILY
Status: DISCONTINUED | OUTPATIENT
Start: 2019-07-30 | End: 2019-07-31

## 2019-07-30 RX ORDER — MIDODRINE HYDROCHLORIDE 5 MG/1
10 TABLET ORAL
Status: DISCONTINUED | OUTPATIENT
Start: 2019-07-30 | End: 2019-07-31

## 2019-07-30 RX ORDER — LORAZEPAM 2 MG/ML
2 INJECTION INTRAMUSCULAR ONCE
Status: COMPLETED | OUTPATIENT
Start: 2019-07-30 | End: 2019-07-30

## 2019-07-30 RX ORDER — HEPARIN SODIUM 5000 [USP'U]/ML
5000 INJECTION, SOLUTION INTRAVENOUS; SUBCUTANEOUS EVERY 8 HOURS SCHEDULED
Status: DISCONTINUED | OUTPATIENT
Start: 2019-07-30 | End: 2019-08-01

## 2019-07-30 RX ORDER — ECHINACEA PURPUREA EXTRACT 125 MG
1 TABLET ORAL
Status: DISCONTINUED | OUTPATIENT
Start: 2019-07-30 | End: 2019-08-01 | Stop reason: HOSPADM

## 2019-07-30 RX ORDER — LORAZEPAM 2 MG/ML
4 INJECTION INTRAMUSCULAR ONCE
Status: COMPLETED | OUTPATIENT
Start: 2019-07-30 | End: 2019-07-30

## 2019-07-30 RX ORDER — OCTREOTIDE ACETATE 100 UG/ML
100 INJECTION, SOLUTION INTRAVENOUS; SUBCUTANEOUS EVERY 8 HOURS SCHEDULED
Status: DISCONTINUED | OUTPATIENT
Start: 2019-07-30 | End: 2019-07-31

## 2019-07-30 RX ADMIN — FLUTICASONE PROPIONATE 1 SPRAY: 50 SPRAY, METERED NASAL at 22:24

## 2019-07-30 RX ADMIN — HEPARIN SODIUM 5000 UNITS: 5000 INJECTION INTRAVENOUS; SUBCUTANEOUS at 15:57

## 2019-07-30 RX ADMIN — HEPARIN SODIUM 5000 UNITS: 5000 INJECTION INTRAVENOUS; SUBCUTANEOUS at 21:51

## 2019-07-30 RX ADMIN — CHLORHEXIDINE GLUCONATE 0.12% ORAL RINSE 15 ML: 1.2 LIQUID ORAL at 14:05

## 2019-07-30 RX ADMIN — SODIUM CHLORIDE 8 MG/HR: 9 INJECTION, SOLUTION INTRAVENOUS at 01:10

## 2019-07-30 RX ADMIN — SODIUM CHLORIDE 75 ML/HR: 0.9 INJECTION, SOLUTION INTRAVENOUS at 10:31

## 2019-07-30 RX ADMIN — NICOTINE 1 PATCH: 21 PATCH TRANSDERMAL at 08:02

## 2019-07-30 RX ADMIN — SALINE NASAL SPRAY 1 SPRAY: 1.5 SOLUTION NASAL at 22:23

## 2019-07-30 RX ADMIN — MELATONIN 6 MG: 3 TAB ORAL at 21:52

## 2019-07-30 RX ADMIN — ALBUMIN (HUMAN) 25 G: 12.5 SOLUTION INTRAVENOUS at 20:12

## 2019-07-30 RX ADMIN — ALBUMIN (HUMAN) 25 G: 12.5 SOLUTION INTRAVENOUS at 09:03

## 2019-07-30 RX ADMIN — LORAZEPAM 4 MG: 2 INJECTION INTRAMUSCULAR; INTRAVENOUS at 20:05

## 2019-07-30 RX ADMIN — MIDODRINE HYDROCHLORIDE 10 MG: 5 TABLET ORAL at 14:05

## 2019-07-30 RX ADMIN — ALBUMIN (HUMAN) 25 G: 12.5 SOLUTION INTRAVENOUS at 15:57

## 2019-07-30 RX ADMIN — LORAZEPAM 2 MG: 2 INJECTION INTRAMUSCULAR; INTRAVENOUS at 08:12

## 2019-07-30 RX ADMIN — ONDANSETRON 4 MG: 2 INJECTION INTRAMUSCULAR; INTRAVENOUS at 14:05

## 2019-07-30 RX ADMIN — LACTULOSE 30 G: 10 SOLUTION ORAL at 21:52

## 2019-07-30 RX ADMIN — CHLORHEXIDINE GLUCONATE 0.12% ORAL RINSE 15 ML: 1.2 LIQUID ORAL at 21:55

## 2019-07-30 RX ADMIN — OCTREOTIDE ACETATE 100 MCG: 100 INJECTION, SOLUTION INTRAVENOUS; SUBCUTANEOUS at 22:00

## 2019-07-30 RX ADMIN — CEFTRIAXONE SODIUM 1000 MG: 10 INJECTION, POWDER, FOR SOLUTION INTRAVENOUS at 05:26

## 2019-07-30 RX ADMIN — SALINE NASAL SPRAY 1 SPRAY: 1.5 SOLUTION NASAL at 01:10

## 2019-07-30 RX ADMIN — PANTOPRAZOLE SODIUM 40 MG: 40 INJECTION, POWDER, FOR SOLUTION INTRAVENOUS at 20:12

## 2019-07-30 RX ADMIN — PREDNISOLONE SODIUM PHOSPHATE 40 MG: 15 SOLUTION ORAL at 15:58

## 2019-07-30 RX ADMIN — LACTULOSE 30 G: 10 SOLUTION ORAL at 18:59

## 2019-07-30 RX ADMIN — FOLIC ACID: 5 INJECTION, SOLUTION INTRAMUSCULAR; INTRAVENOUS; SUBCUTANEOUS at 10:24

## 2019-07-30 RX ADMIN — MIDODRINE HYDROCHLORIDE 10 MG: 5 TABLET ORAL at 17:01

## 2019-07-30 NOTE — ASSESSMENT & PLAN NOTE
Leukocytosis noted 22 --> 18  Without fever  Currently on ceftriaxone for SBP prophylaxis in the settings of hematemesis plus liver cirrhosis  Blood pressure stable  Follow-up with pending blood cultures  Follow-up with abdominal ultrasound to assess for any drainable ascites    Continue daily CBC with differential

## 2019-07-30 NOTE — ASSESSMENT & PLAN NOTE
· In the setting of alcoholic cirrhosis  · Ammonia on admission 73 noted to be 102  · Start Lactulose PO BID  · GI following

## 2019-07-30 NOTE — PROCEDURES
Temporary HD Catheter  Date/Time: 7/30/2019 3:49 PM  Performed by: TREY Cartagena  Authorized by: TREY Cartagena     Patient location:  Bedside  Consent:     Consent obtained:  Verbal    Consent given by:  Patient (obtained by Dr Carry Collet)    Risks discussed:  Arterial puncture, incorrect placement, nerve damage, pneumothorax, infection and bleeding    Alternatives discussed:  No treatment  Universal protocol:     Procedure explained and questions answered to patient or proxy's satisfaction: yes      Relevant documents present and verified: yes      Test results available and properly labeled: yes      Radiology Images displayed and confirmed  If images not available, report reviewed: yes      Required blood products, implants, devices, and special equipment available: yes      Site/side marked: yes      Immediately prior to procedure, a time out was called: yes      Patient identity confirmed:  Hospital-assigned identification number, arm band and verbally with patient  Pre-procedure details:     Hand hygiene: Hand hygiene performed prior to insertion      Sterile barrier technique: All elements of maximal sterile technique followed      Skin preparation:  2% chlorhexidine    Skin preparation agent: Skin preparation agent completely dried prior to procedure    Indications:     Central line indications: dialysis    Anesthesia (see MAR for exact dosages):      Anesthesia method:  Local infiltration    Local anesthetic:  Lidocaine 1% w/o epi  Procedure details:     Location:  Right internal jugular    Vessel type: vein      Laterality:  Right    Approach: percutaneous technique used      Patient position:  Trendelenburg    Catheter type:  Double lumen    Catheter size:  12 5 Fr    Catheter length:  16 cm    Landmarks identified: yes      Ultrasound guidance: yes      Sterile ultrasound techniques: Sterile gel and sterile probe covers were used      Number of attempts:  1    Successful placement: yes Post-procedure details:     Post-procedure:  Dressing applied and line sutured    Assessment:  Blood return through all ports, no pneumothorax on x-ray, free fluid flow and placement verified by x-ray    Patient tolerance of procedure:   Tolerated well, no immediate complications

## 2019-07-30 NOTE — ASSESSMENT & PLAN NOTE
· Reportedly drank 1 liter of vodka daily up until 5 months ago when he "went on probation " Since then has become jaundiced with decreased appetite 2/2 N/V  Recently seen at [facility] center but left AMA after 2 weeks  Not clear about what work-up was done or findings  Now with increasing SOB and coffee-ground emesis  No further episodes of coffee-ground emesis noted  Hemoglobin has been stable  · Alcohol discriminant  score more than 32 indicating poor prognosis  · Noted bilirubin 44 8, INR 1 48  · Follow-up abdominal ultrasound  · Started on prednisolone per GI recommendation  · Continue monitor CMP, CBC  · Tylenol for pain p r n  No more than 2 g a day per GI recommendation  · Continue CIWA protocol  · Overall prognosis guarded    · GI following

## 2019-07-30 NOTE — PLAN OF CARE
Problem: PAIN - ADULT  Goal: Verbalizes/displays adequate comfort level or baseline comfort level  Description  Interventions:  - Encourage patient to monitor pain and request assistance  - Assess pain using appropriate pain scale  - Administer analgesics based on type and severity of pain and evaluate response  - Implement non-pharmacological measures as appropriate and evaluate response  - Consider cultural and social influences on pain and pain management  - Notify physician/advanced practitioner if interventions unsuccessful or patient reports new pain  Outcome: Progressing     Problem: SAFETY ADULT  Goal: Patient will remain free of falls  Description  INTERVENTIONS:  - Assess patient frequently for physical needs  -  Identify cognitive and physical deficits and behaviors that affect risk of falls    -  [place] fall precautions as indicated by assessment   - Educate patient/family on patient safety including physical limitations  - Instruct patient to call for assistance with activity based on assessment  - Modify environment to reduce risk of injury  - Consider OT/PT consult to assist with strengthening/mobility  Outcome: Progressing  Goal: Maintain or return to baseline ADL function  Description  INTERVENTIONS:  -  Assess patient's ability to carry out ADLs; assess patient's baseline for ADL function and identify physical deficits which impact ability to perform ADLs (bathing, care of mouth/teeth, toileting, grooming, dressing, etc )  - Assess/evaluate cause of self-care deficits   - Assess range of motion  - Assess patient's mobility; develop plan if impaired  - Assess patient's need for assistive devices and provide as appropriate  - Encourage maximum independence but intervene and supervise when necessary  ¯ Involve family in performance of ADLs  ¯ Assess for home care needs following discharge   ¯ Request OT consult to assist with ADL evaluation and planning for discharge  ¯ Provide patient education as appropriate  Outcome: Progressing  Goal: Maintain or return mobility status to optimal level  Description  INTERVENTIONS:  - Assess patient's baseline mobility status (ambulation, transfers, stairs, etc )    - Identify cognitive and physical deficits and behaviors that affect mobility  - Identify mobility aids required to assist with transfers and/or ambulation (gait belt, sit-to-stand, lift, walker, cane, etc )  - [place] fall precautions as indicated by assessment  - Record patient progress and toleration of activity level on Mobility SBAR; progress patient to next Phase/Stage  - Instruct patient to call for assistance with activity based on assessment  - Request Rehabilitation consult to assist with strengthening/weightbearing, etc   Outcome: Progressing     Problem: DISCHARGE PLANNING  Goal: Discharge to home or other facility with appropriate resources  Description  INTERVENTIONS:  - Identify barriers to discharge w/patient and caregiver  - Arrange for needed discharge resources and transportation as appropriate  - Identify discharge learning needs (meds, wound care, etc )  - Arrange for interpretive services to assist at discharge as needed  - Refer to Case Management Department for coordinating discharge planning if the patient needs post-hospital services based on physician/advanced practitioner order or complex needs related to functional status, cognitive ability, or social support system  Outcome: Progressing     Problem: Knowledge Deficit  Goal: Patient/family/caregiver demonstrates understanding of disease process, treatment plan, medications, and discharge instructions  Description  Complete learning assessment and assess knowledge base    Interventions:  - Provide teaching at level of understanding  - Provide teaching via preferred learning methods  Outcome: Progressing     Problem: INFECTION - ADULT  Goal: Absence or prevention of progression during hospitalization  Description  INTERVENTIONS:  - Assess and monitor for signs and symptoms of infection  - Monitor lab/diagnostic results  - Monitor all insertion sites, i e  indwelling lines, tubes, and drains  - Monitor endotracheal (as able) and nasal secretions for changes in amount and color  - [place] appropriate cooling/warming therapies per order  - Administer medications as ordered  - Instruct and encourage patient and family to use good hand hygiene technique  - Identify and instruct in appropriate isolation precautions for identified infection/condition  Outcome: Progressing     Problem: Potential for Falls  Goal: Patient will remain free of falls  Description  INTERVENTIONS:  - Assess patient frequently for physical needs  -  Identify cognitive and physical deficits and behaviors that affect risk of falls    -  [place] fall precautions as indicated by assessment   - Educate patient/family on patient safety including physical limitations  - Instruct patient to call for assistance with activity based on assessment  - Modify environment to reduce risk of injury  - Consider OT/PT consult to assist with strengthening/mobility  Outcome: Progressing     Problem: Prexisting or High Potential for Compromised Skin Integrity  Goal: Skin integrity is maintained or improved  Description  INTERVENTIONS:  - Identify patients at risk for skin breakdown  - Assess and monitor skin integrity  - Assess and monitor nutrition and hydration status  - Monitor labs (i e  albumin)  - Assess for incontinence   - Turn and reposition patient  - Assist with mobility/ambulation  - Relieve pressure over bony prominences  - Avoid friction and shearing  - Provide appropriate hygiene as needed including keeping skin clean and dry  - Evaluate need for skin moisturizer/barrier cream  - Collaborate with interdisciplinary team (i e  Nutrition, Rehabilitation, etc )   - Patient/family teaching  Outcome: Progressing     Problem: METABOLIC, FLUID AND ELECTROLYTES - ADULT  Goal: Electrolytes maintained within normal limits  Description  INTERVENTIONS:  - Monitor labs and assess patient for signs and symptoms of electrolyte imbalances  - Administer electrolyte replacement as ordered  - Monitor response to electrolyte replacements, including repeat lab results as appropriate  - Instruct patient on fluid and nutrition as appropriate  Outcome: Progressing  Goal: Fluid balance maintained  Description  INTERVENTIONS:  - Monitor labs and assess for signs and symptoms of volume excess or deficit  - Monitor I/O and WT  - Instruct patient on fluid and nutrition as appropriate  Outcome: Progressing  Goal: Glucose maintained within target range  Description  INTERVENTIONS:  - Monitor Blood Glucose as ordered  - Assess for signs and symptoms of hyperglycemia and hypoglycemia  - Administer ordered medications to maintain glucose within target range  - Assess nutritional intake and initiate nutrition service referral as needed  Outcome: Progressing     Problem: SKIN/TISSUE INTEGRITY - ADULT  Goal: Skin integrity remains intact  Description  INTERVENTIONS  - Identify patients at risk for skin breakdown  - Assess and monitor skin integrity  - Assess and monitor nutrition and hydration status  - Monitor labs (i e  albumin)  - Assess for incontinence   - Turn and reposition patient  - Assist with mobility/ambulation  - Relieve pressure over bony prominences  - Avoid friction and shearing  - Provide appropriate hygiene as needed including keeping skin clean and dry  - Evaluate need for skin moisturizer/barrier cream  - Collaborate with interdisciplinary team (i e  Nutrition, Rehabilitation, etc )   - Patient/family teaching  Outcome: Progressing  Goal: Incision(s), wounds(s) or drain site(s) healing without S/S of infection  Description  INTERVENTIONS  - Assess and document risk factors for skin impairment   - Assess and document dressing, incision, wound bed, drain sites and surrounding tissue  - Initiate Nutrition services consult and/or wound management as needed  Outcome: Progressing  Goal: Oral mucous membranes remain intact  Description  INTERVENTIONS  - Assess oral mucosa and hygiene practices  - Implement preventative oral hygiene regimen  - Implement oral medicated treatments as ordered  - Initiate Nutrition services referral as needed  Outcome: Progressing     Problem: HEMATOLOGIC - ADULT  Goal: Maintains hematologic stability  Description  INTERVENTIONS  - Assess for signs and symptoms of bleeding or hemorrhage  - Monitor labs  - Administer supportive blood products/factors as ordered and appropriate  Outcome: Progressing
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
## 2019-07-30 NOTE — ASSESSMENT & PLAN NOTE
· Creatinine on admission 4 71  · Worsening renal function  · Creatinine  Today noted to be 7 47  No previous results to compare to  · Suspected 2/2 hepatorenal syndrome in conjunction with dehydration/poor oral intake/GI bleed  · Currently on albumin, midodrine, octreotide  · Avoid nephrotoxic agents   · Nephrology planning for dialysis and will transfer patient to ICU per Nephrology recommendations
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
## 2019-07-30 NOTE — NURSING NOTE
08:00 CIWA score was 8, gave 2mg lorazepam per protocol  Pt calmed and fell asleep  Attempted to wake patient for blood work, he was difficult to arouse and lethargic  Physician at bedside to evaluate

## 2019-07-30 NOTE — PROGRESS NOTES
GI Progress Note - Vita Bustillos 47 y o  male MRN: [id number]    Unit/Bed#: -01 Encounter: [id number]    Subjective: Vita received lorazepam this morning, he is drowsy  He is unable to provide subjective history except that he has abdominal pain  He reports a BM overnight but this is not documented, only documentation of output overnight was a few drops of erickson BRBPR  He has been refusing blood draws  Objective:     Vitals: Blood pressure 112/67, pulse 96, temperature 97 8 °F (36 6 °C), temperature source Oral, resp  rate 20, height 6' 6" (1 981 m), weight 106 kg (233 lb 11 oz), SpO2 94 %  ,Body mass index is 27 01 kg/m²  Intake/Output Summary (Last 24 hours) at 7/30/2019 0851  Last data filed at 7/30/2019 0700  Gross per 24 hour   Intake 2065 ml   Output 125 ml   Net 1940 ml       Physical Exam:     General Appearance: Drowsy, appears chronically ill, (+) jaundiced  Lungs: Clear to auscultation bilaterally, no respiratory distress  Heart: RRR, no murmur  Abdomen: Distended, bowel sounds active, generalized TTP  Extremities: No cyanosis or LE edema    Invasive Devices     Peripheral Intravenous Line            Peripheral IV 07/29/19 Left Antecubital 1 day                Lab Results:  Results from last 7 days   Lab Units 07/29/19  2330  07/29/19  0108   WBC Thousand/uL  --   --  22 71*   HEMOGLOBIN g/dL 10 2*   < > 11 4*   HEMATOCRIT % 29 1*   < > 32 8*   PLATELETS Thousands/uL  --   --  275   NEUTROS PCT %  --   --  88*   LYMPHS PCT %  --   --  4*   MONOS PCT %  --   --  6   EOS PCT %  --   --  0    < > = values in this interval not displayed       Results from last 7 days   Lab Units 07/29/19  0108   POTASSIUM mmol/L 3 8   CHLORIDE mmol/L 92*   CO2 mmol/L 20*   BUN mg/dL 46*   CREATININE mg/dL 4 71*   CALCIUM mg/dL 8 0*   ALK PHOS U/L 327*   ALT U/L 70   AST U/L 162*     Results from last 7 days   Lab Units 07/29/19  0108   INR  1 25*     Results from last 7 days   Lab Units 07/29/19  0108   LIPASE u/L 788*       Imaging Studies: I have personally reviewed pertinent imaging studies  Ct Abdomen Pelvis Wo Contrast  Result Date: 7/29/2019  Impression: Fatty enlarged liver with ascites  No small bowel obstruction  Xr Abdomen Obstruction Series  Result Date: 7/29/2019  Impression: No evidence of bowel obstruction or ileus  Xr Chest 1 View  Result Date: 7/30/2019  Impression: Coarsened lung markings suggesting chronic lung changes  No focal infiltrate         Assessment and Plan:     Abdominal Distention  Coffee Ground Emesis  - Significant abdominal distention on admission, imaging with noncontrastred CT and obstruction series were benign and did not show evidence of gastric distention or bowel obstruction  - NG tube deferred  - Hb has remained relatively stable, 10 2 last night but refused bloodwork early this AM, will plan for bloodwork this AM while he is drowsy from lorazepam  - Clear liquid diet  - Stop protonix drip, transition to PPI IV BID  - Tylenol PRN, no more than 2 g daily  - Simethicone PRN  - Abdominal US pending including dopplers  - Serial abdominal exams  - IVF hydration and supportive care with antiemetics/zofran PRN, can consider trial of reglan  - Consider start DVT prophylaxis as his Hb has remained stable  - Tentatively plan for EGD tomorrow for further evaluation of coffee ground emesis     Alcoholic Hepatitis  - Pt was drinking 1L vodka daily up until 5 months ago but has continued to drink a 30 pack of beer weekly, last drink on Saturday 7/27  - DF >32 indicating poor prognosis, he will need either prednisolone or trental initiated pending blood culture results  - He has remained afebrile, blood cultures should have preliminary result this afternoon  - Trend LFTs and INR daily, labs pending from today  - CIWA protocol, pt reports history of withdrawal seizures in the past  - MVI, thiamine, folic acid     Leukocytosis  - WBC 22 71 on admission, afebrile during the admission and no reported fevers prior to admission  - This could be in the setting of alcoholic hepatitis v acute abdominal process v less likely SBP  - Continue ceftriaxone 1 g daily for SBP prophylaxis as well as standing albumin  - CXR without evidence of acute infiltrate  - Blood cultures pending     SOLO  - Suspect this is pre-renal but could consider component of HRS  - Continue IVF hydration, standing albumin  - Nephrology following  - Repeat bloodwork pending as patient was previously refusing'      The patient will be seen by Dr Carlo Mckeon

## 2019-07-30 NOTE — ASSESSMENT & PLAN NOTE
Likely multifactorial in the settings of liver cirrhosis, acute kidney injury suspected hepatorenal syndrome versus history of  substance use  Patient also admits of selling substance    Follow-up with U tox  Continue tele monitoring

## 2019-07-30 NOTE — ASSESSMENT & PLAN NOTE
Currently patient appears drowsy, confused  Blood pressure stable  Ammonia noted trending upward as well as renal function worsening  Symptoms likely multifactorial in the settings of liver cirrhosis and acute renal failure suspected due to hepatorenal syndrome vs recent inpatient use of opiate and benzo for pain and CIWA protocol  Patient is on IV albumin, octreotide, midodrine, lactulose  Avoid sedative medication  May use Tylenol p r n  For pain no more than 2 g in 24hrs per GI  Plan is to start hemodialysis but will be transferred to ICU for close monitoring during the dialysis per Nephrology recommendation

## 2019-07-30 NOTE — PROGRESS NOTES
Progress Note - Critical Care   Vita Bustillos 47 y o  male MRN: [id number]  Unit/Bed#:  Encounter: [id number]    Assessment:   Principal Problem:    Alcoholic cirrhosis ([address] )  Active Problems:    Coffee ground emesis    Hyponatremia    Elevated serum creatinine    Hyperammonemia (HCC)    Syncope  Resolved Problems:    * No resolved hospital problems   *    Plan:      Neuro:   -encephalopathy likely multifactorial secondary to alcoholic hepatitis, uremia and alcohol withdrawal  -continue with CIWA protocol  -seizure precautions   -monitor neuro status closely   -[facility] ICU   -sleep hygiene   -folic acid/thiamine  -caution with narcotics given tenuous mental status   CV:   -monitor hemodynamics closely  -continue smoking cessation with nicotine patch   Lung:   -monitor respiratory status closely  -early mobilization  -incentive spirometry if able to participate  -aspiration precautions   GI:   -coffee ground emesis on presentation, continue with protonix BID and eventual EGD when stable  -alcoholic hepatitis with discriminate function greater than 32 indicating poor prognosis, thirty day course of prednisolone initiated today   -strongly encourage alcohol cessation   -continue ceftriaxone for SBP coverage  -continue albumin, midodrine and octreotide for hepatorenal syndrome   -continue diet as mental status will allow  -if he becomes more encephalopathic will need enteral access and TF  -GI following closely, appreciate recommendations    FEN:   -creatinine worsening today  -nephrology following, appreciate recommendations  -transfer to step down for temporary HD catheter insertion and initiation of HD  -continue albumin, octreotride and midodrine for hepatorenal syndrome    :   -monitor urine output closely    ID:   -monitor WBCs and temperature curve  -follow up on culture data  -continue ceftriaxone for SBP coverage   Heme:   -will initiate heparin for DVT prophylaxis now that Hgb is stable  -continue to monitor hgb, transfuse for hgb <7 0   Endo:   -glucose checks with SSI coverage as needed   Msk/Skin:   -turn and reposition q 2 hours  -PT/OT    Disposition:   -transfer to step down level of care    ______________________________________________________________________    Chief Complaint:   Abdominal distention, vomiting, jaundice    HPI/24hr events:   Patient is a 58-year-old male with past medical history of alcohol abuse and GERD  He presented to the emergency department on 07/29 for evaluation of increasing abdominal distention over  Approximately 1 week with associated nausea and vomiting  The patient also described potential coffee-ground emesis, poor intake, and no bowel movements for approximately 1 week  Initially reported no alcohol use in the past 5 months  Upon further questioning he reported that he had been drinking a 30 pack of beer every week  His last ingestion of alcohol was this past Saturday  Does have a history of alcohol withdrawal seizure  Was admitted originally to the internal medicine service with GI following for alcoholic hepatitis  Patient has had a worsening creatinine along with worsening encephalopathy over past 24 hours requiring initiation of dialysis  The patient is transferred to the step-down unit upon request of Nephrology for line insertion and monitoring during his 1st dialysis treatment  ______________________________________________________________________    Physical Exam:   PHYSICAL EXAM  General :   Chronically ill appearing male, jaundice, encephalopathic   Neuro:   CN 2-12 intact  Non Focal  HEENT:  Normocephalic, atraumatic, + scleral icterus,  Pupils 3 brisk bilat  PERRLA, EOMI, hearing grossly intact  Symmetrical facial expressions without droop or slurred speech  Tongue midline without fasiculations  Neck:  No JVD, FROM, No masses/adenopathy  Back:   Symmetrical, atruamatic, spinous process pain free on palpation    Cardiovascular:   No heaves,lifts,thrills  S1/S2 [place] bpm No noted MRGC  Pulmonary:   Symmetrical expansion of chest  Resp even & unlabored  GI :   Abd distended SNT + BSX4 quads  No palp/pulsitile masses  :  n/a  Musculoskeletal:  Symmetrical  + LE edema   FROM in UE & LE  Muscle strength 5/5  No lymphadenopathy  Extrem warm to touch  DTR grossly intact  Brachial/radial/femoral/popliteal/pedal/posterior tibial pulses +2  No lesions noted  CN 2-12 grossly intact  Sensation and motor function intact   ______________________________________________________________________  Vitals:    19 0855 19 0900 19 1100 19 1328   BP:  119/78 137/79 143/80   BP Location:   Right arm Right arm   Pulse:  98 101 99   Resp:   18 18   Temp:   98 1 °F (36 7 °C) 97 8 °F (36 6 °C)   TempSrc:   Oral Oral   SpO2: 93%  94%    Weight:       Height:         Temperature:   Temp (24hrs), Av 9 °F (36 6 °C), Min:97 5 °F (36 4 °C), Max:98 3 °F (36 8 °C)    Current Temperature: 97 8 °F (36 6 °C)  Weights:   IBW: 91 4 kg    Body mass index is 27 01 kg/m²  Weight (last 2 days)     Date/Time   Weight    19 0549   106 (233 69)    19 1500   106 (233)    19 0600   106 (233 69)    19 0505   106 (233 69)    19 0056   106 (233)            Hemodynamic Monitoring:  N/A     Non-Invasive/Invasive Ventilation Settings:  Respiratory    Lab Data (Last 4 hours)    None         O2/Vent Data (Last 4 hours)    None              No results found for: PHART, PVX6XHD, PO2ART, CGQ2JDJ, W0WMUDLG, BEART, SOURCE  SpO2: SpO2: 94 %  Intake and Outputs:  I/O       701 - 07/ -  -  0700    P  O   0     I V  (mL/kg)   (19 5) 75 (0 7)    IV Piggyback 500 1      Total Intake(mL/kg) 500 1 (4 7)  (19 5) 75 (0 7)    Urine (mL/kg/hr)  125 (0)     Total Output  125     Net +500 1 +1940 +75           Unmeasured Stool Occurrence   1 x        Nutrition:        Diet Orders   (From admission, onward)             Start     Ordered    07/30/19 1355  Diet Clear Liquid  Diet effective now     Question Answer Comment   Diet Type Clear Liquid    RD to adjust diet per protocol? Yes        07/30/19 1354              Labs:   Results from last 7 days   Lab Units 07/30/19  1021 07/29/19  2330 07/29/19  1827  07/29/19  0108   WBC Thousand/uL 18 94*  --   --   --  22 71*   HEMOGLOBIN g/dL 10 2* 10 2* 10 2*   < > 11 4*   HEMATOCRIT % 28 6* 29 1* 28 8*   < > 32 8*   PLATELETS Thousands/uL 228  --   --   --  275   NEUTROS PCT %  --   --   --   --  88*   MONOS PCT %  --   --   --   --  6   MONO PCT % 5  --   --   --   --     < > = values in this interval not displayed  Results from last 7 days   Lab Units 07/30/19  1020 07/29/19  0108   POTASSIUM mmol/L 3 8 3 8   CHLORIDE mmol/L 95* 92*   CO2 mmol/L 15* 20*   BUN mg/dL 67* 46*   CREATININE mg/dL 7 74* 4 71*   CALCIUM mg/dL 7 2* 8 0*   ALK PHOS U/L 283* 327*   ALT U/L 56 70   AST U/L 160* 162*     Results from last 7 days   Lab Units 07/30/19  1020   MAGNESIUM mg/dL 2 2     Results from last 7 days   Lab Units 07/30/19  1020   PHOSPHORUS mg/dL 6 8*      Results from last 7 days   Lab Units 07/30/19  1005 07/29/19  0108   INR  1 48* 1 25*   PTT seconds  --  34     Results from last 7 days   Lab Units 07/29/19  0108   LACTIC ACID mmol/L 2 0     No results found for: TROPONINI  Imaging:   US abdomen complete   Final Result      Cirrhotic nodular liver  Cholelithiasis with gallbladder wall thickening and pericholecystic fluid  Correlate clinically as this could be the result of cirrhotic liver and/or cholecystitis  HIDA scan may be useful  Mild to moderate right upper quadrant perihepatic ascites  Workstation performed: [id number]         XR abdomen obstruction series   Final Result      No evidence of bowel obstruction or ileus         Workstation performed: [id number]         XR chest 1 view   Final Result      Coarsened lung markings suggesting chronic lung changes  No focal infiltrate  Workstation performed: [id number]         CT abdomen pelvis wo contrast   Final Result      Fatty enlarged liver with ascites  No small bowel obstruction           Workstation performed: [id number]           EKG: NSR   Micro:  No results found for: Hanna Bingham, WOUNDCULT, SPUTUMCULTUR  Allergies: No Known Allergies  Medications:   Scheduled Meds:  Current Facility-Administered Medications:  acetaminophen 650 mg Oral Q8H PRN TREY Be    albumin human 25 g Intravenous TID TREY Be    cefTRIAXone 1,000 mg Intravenous Q24H TREY Be Last Rate: 1,000 mg (07/30/19 0526)   chlorhexidine 15 mL Swish & Spit Q12H [facility] TREY Be    fluticasone 1 spray Each Nare HS TREY Be    folic acid 1 mg, thiamine 100 mg in 0 9% sodium chloride 100 mL IVPB  Intravenous Daily TREY Be Last Rate: 200 mL/hr at 07/30/19 1024   lactulose 30 g Oral BID TREY Be    melatonin 6 mg Oral HS TREY Be    [facility] Hold] midodrine 10 mg Oral TID AC Vicky Umana MD    nicotine 1 patch Transdermal Daily TREY Be    [facility] Hold] octreotide 100 mcg Intravenous [facility] Vicky Umana MD    ondansetron 4 mg Intravenous Q6H PRN TREY Be    pantoprazole 40 mg Intravenous Q12H TREY Be    simethicone 40 mg Oral Q6H PRN TREY Be    sodium chloride 1 spray Each Nare Q1H PRN TREY Be    sodium chloride 75 mL/hr Intravenous Continuous TREY Be Last Rate: 75 mL/hr (07/30/19 1034)     Continuous Infusions:  sodium chloride 75 mL/hr Last Rate: 75 mL/hr (07/30/19 1034)     PRN Meds:    acetaminophen 650 mg Q8H PRN   ondansetron 4 mg Q6H PRN   simethicone 40 mg Q6H PRN   sodium chloride 1 spray Q1H PRN     VTE Pharmacologic Prophylaxis: Sequential compression device (Venodyne)  and Heparin  VTE Mechanical Prophylaxis: sequential compression device  Invasive lines and devices: Invasive Devices     Peripheral Intravenous Line            Peripheral IV 07/29/19 Left Antecubital 1 day    Peripheral IV 07/30/19 Dorsal (posterior); Right Wrist less than 1 day              Counseling / Coordination of Care  Total Critical Care time spent 46 minutes excluding procedures, teaching and family updates  Code Status: Level 1 - Full Code    Portions of the record may have been created with voice recognition software  Occasional wrong word or "sound a like" substitutions may have occurred due to the inherent limitations of voice recognition software  Read the chart carefully and recognize, using context, where substitutions have occurred      TREY Arellano

## 2019-07-30 NOTE — ASSESSMENT & PLAN NOTE
· May be 2/2 fluid-volume status in the setting of liver failure  · Check urine Na 12, urine osmolality 336 and serum osmo 281  · Currently improving 131  · Nephrology following

## 2019-07-30 NOTE — PROGRESS NOTES
Progress Note - Vita Bustillos 1970, 47 y o  male MRN: [id number]    Unit/Bed#:  Encounter: [id number]    Primary Care Provider: Lula Figueroa MD   Date and time admitted to hospital: 7/29/2019 12:51 AM        * Alcoholic cirrhosis ([facility] )  Assessment & Plan  · Reportedly drank 1 liter of vodka daily up until 5 months ago when he "went on probation " Since then has become jaundiced with decreased appetite 2/2 N/V  Recently seen at [facility] center but left AMA after 2 weeks  Not clear about what work-up was done or findings  Now with increasing SOB and coffee-ground emesis  No further episodes of coffee-ground emesis noted  Hemoglobin has been stable  · Alcohol discriminant  score more than 32 indicating poor prognosis  · Noted bilirubin 44 8, INR 1 48  · Follow-up abdominal ultrasound  · Started on prednisolone per GI recommendation  · Continue monitor CMP, CBC  · Tylenol for pain p r n  No more than 2 g a day per GI recommendation  · Continue CIWA protocol  · Overall prognosis guarded  · GI following    Coffee ground emesis  Assessment & Plan  · Likely r/t #1  · No further episodes of hematemesis noted  · Hemoglobin stable around 10 range  · Currently on clear liquid per GI  · Continue ceftriaxone for SBP prophylaxis  · Continue IV Protonix b i d  Matilde Longoria · GI following and plan for EGD once stable  SOLO (acute kidney injury) ([facility] )  Assessment & Plan  · Creatinine on admission 4 71  · Worsening renal function  · Creatinine  Today noted to be 7 47  No previous results to compare to  · Suspected 2/2 hepatorenal syndrome in conjunction with dehydration/poor oral intake/GI bleed  · Currently on albumin, midodrine, octreotide  · Avoid nephrotoxic agents   · Nephrology planning for dialysis and will transfer patient to ICU per Nephrology recommendations  Encephalopathy  Assessment & Plan  Currently patient appears drowsy, confused  Blood pressure stable    Ammonia noted trending upward as well as renal function worsening  Symptoms likely multifactorial in the settings of liver cirrhosis and acute renal failure suspected due to hepatorenal syndrome vs recent inpatient use of opiate and benzo for pain and CIWA protocol  Patient is on IV albumin, octreotide, midodrine, lactulose  Avoid sedative medication  May use Tylenol p r n  For pain no more than 2 g in 24hrs per GI  Plan is to start hemodialysis but will be transferred to ICU for close monitoring during the dialysis per Nephrology recommendation  Leukocytosis  Assessment & Plan  Leukocytosis noted 22 --> 18  Without fever  Currently on ceftriaxone for SBP prophylaxis in the settings of hematemesis plus liver cirrhosis  Blood pressure stable  Follow-up with pending blood cultures  Follow-up with abdominal ultrasound to assess for any drainable ascites  Continue daily CBC with differential         Syncope  Assessment & Plan  Likely multifactorial in the settings of liver cirrhosis, acute kidney injury suspected hepatorenal syndrome versus history of  substance use  Patient also admits of selling substance  Follow-up with U tox  Continue tele monitoring    Hyperammonemia (HCC)  Assessment & Plan  · In the setting of alcoholic cirrhosis  · Ammonia on admission 73 noted to be 102  · Start Lactulose PO BID  · GI following    Hyponatremia  Assessment & Plan  · May be 2/2 fluid-volume status in the setting of liver failure  · Check urine Na 12, urine osmolality 336 and serum osmo 281  · Currently improving 131  · Nephrology following    VTE Pharmacologic Prophylaxis:   Pharmacologic: Pharmacologic VTE Prophylaxis contraindicated due to Hematemesis    Patient Centered Rounds: I have performed bedside rounds with nursing staff today  Discussions with Specialists or Other Care Team Provider:  GI, Nephrology    Education and Discussions with Family / Patient:  Patient    Time Spent for Care: 30 minutes    More than 50% of total time spent on counseling and coordination of care as described above  Current Length of Stay: 1 day(s)    Current Patient Status: Inpatient   Certification Statement: The patient will continue to require additional inpatient hospital stay due to Acute kidney failure suspected due to hepatorenal syndrome, hematemesis, liver cirrhosis  Discharge Plan: tbd    Code Status: Level 1 - Full Code      Subjective:   Currently patient noted to be drowsy, confused  Ill appearing  Blood pressure stable  Complaining of not being able to find his wallet  Denies chest pain, fever, chills, nausea, vomiting, further episodes of hematemesis, dysuria, diarrhea  Denies any new complaints  Objective:     Vitals:   Temp (24hrs), Av °F (36 7 °C), Min:97 8 °F (36 6 °C), Max:98 3 °F (36 8 °C)    Temp:  [97 8 °F (36 6 °C)-98 3 °F (36 8 °C)] 97 9 °F (36 6 °C)  HR:  [] 96  Resp:  [18-22] 18  BP: (112-143)/(63-82) 125/82  SpO2:  [90 %-95 %] 95 %  Body mass index is 27 01 kg/m²  Input and Output Summary (last 24 hours): Intake/Output Summary (Last 24 hours) at 2019 1652  Last data filed at 2019 1610  Gross per 24 hour   Intake 2540 ml   Output 125 ml   Net 2415 ml       Physical Exam:     Physical Exam   Constitutional: No distress  Ill appearing  HENT:   Head: Normocephalic and atraumatic  Eyes: Pupils are equal, round, and reactive to light  EOM are normal    Neck: Normal range of motion  Neck supple  Cardiovascular: Normal rate and regular rhythm  Pulmonary/Chest: Effort normal and breath sounds normal  No stridor  No respiratory distress  Abdominal: Soft  Bowel sounds are normal  He exhibits distension  There is no tenderness  There is no guarding  Distended abdominal vessels noted  Musculoskeletal: Normal range of motion  Neurological:   Patient appears drowsy  Wakes up with verbal stimuli but appears confused  Oriented to self, place  Skin: He is not diaphoretic     Generalized jaundice noted  Additional Data:     Labs:    Results from last 7 days   Lab Units 07/30/19  1021  07/29/19  0108   WBC Thousand/uL 18 94*  --  22 71*   HEMOGLOBIN g/dL 10 2*   < > 11 4*   HEMATOCRIT % 28 6*   < > 32 8*   PLATELETS Thousands/uL 228  --  275   BANDS PCT % 5  --   --    NEUTROS PCT %  --   --  88*   LYMPHS PCT %  --   --  4*   LYMPHO PCT % 6*  --   --    MONOS PCT %  --   --  6   MONO PCT % 5  --   --    EOS PCT % 1  --  0    < > = values in this interval not displayed  Results from last 7 days   Lab Units 07/30/19  1020   SODIUM mmol/L 131*   POTASSIUM mmol/L 3 8   CHLORIDE mmol/L 95*   CO2 mmol/L 15*   BUN mg/dL 67*   CREATININE mg/dL 7 74*   ANION GAP mmol/L 21*   CALCIUM mg/dL 7 2*   ALBUMIN g/dL 2 5*   TOTAL BILIRUBIN mg/dL 44 80*   ALK PHOS U/L 283*   ALT U/L 56   AST U/L 160*   GLUCOSE RANDOM mg/dL 94     Results from last 7 days   Lab Units 07/30/19  1005   INR  1 48*             Results from last 7 days   Lab Units 07/29/19  0108   LACTIC ACID mmol/L 2 0           * I Have Reviewed All Lab Data Listed Above  * Additional Pertinent Lab Tests Reviewed: All Labs Within Last 24 Hours Reviewed    Imaging:    Imaging Reports Reviewed Today Include:  CT abdomen pelvis report noted for fatty enlarged liver with ascites      Recent Cultures (last 7 days):           Last 24 Hours Medication List:     Current Facility-Administered Medications:  acetaminophen 650 mg Oral Q8H PRN TREY Cartagena    albumin human 25 g Intravenous TID TREY Cartagena    cefTRIAXone 1,000 mg Intravenous Q24H TREY Cartagena Last Rate: 1,000 mg (07/30/19 0526)   chlorhexidine 15 mL Swish & Spit Q12H [address] TREY Cartagena    fluticasone 1 spray Each Nare HS TREY Cartagena    folic acid 1 mg, thiamine 100 mg in 0 9% sodium chloride 100 mL IVPB  Intravenous Daily TREY Cartagena Last Rate: 200 mL/hr at 07/30/19 1024   heparin (porcine) 5,000 Units Subcutaneous [facility] TREY Cartagena lactulose 30 g Oral TID Jose Dash, CRNP    melatonin 6 mg Oral HS Jose Dash, CRNP    midodrine 10 mg Oral TID AC Jose Dash, CRNP    nicotine 1 patch Transdermal Daily Jose Dash, CRNP    octreotide 100 mcg Intravenous [facility] Jose Dash, CRNP    ondansetron 4 mg Intravenous Q6H PRN Jose Dash, CRNP    pantoprazole 40 mg Intravenous Q12H Jose Dash, CRNP    prednisoLONE 40 mg Oral Daily Jose Dash, CRNP    simethicone 40 mg Oral Q6H PRN Jose Dash, CRNP    sodium chloride 1 spray Each Nare Q1H PRN Jose Dash, CRNP    sodium chloride 75 mL/hr Intravenous Continuous Jose Dash, CRNP Last Rate: 75 mL/hr (07/30/19 1034)        Today, Patient Was Seen By: Daren Johnson MD    ** Please Note: Dictation voice to text software may have been used in the creation of this document   **

## 2019-07-30 NOTE — NURSING NOTE
Pt walked to bathroom with assistance  He was unable to pass stool, but did have several drops of erickson red blood from rectum

## 2019-07-30 NOTE — PROGRESS NOTES
Patient kidney function is deteriorating  Creatinine is up to 7  He is getting more encephalopathic which is likely combination of hepatitic as well as uremic encephalopathy  I discussed dialysis with him which I think is appropriate  He is agreeable for the dialysis  Also discussed with ICU team about possible transfer as I prefer to  start dialysis in ICU with multiple medical comorbid condition    He also seems to have quite a bit of ascites and will advise paracentesis as part of the management    Discussed with ICU team they will put a catheter and I will start dialysis on him today

## 2019-07-30 NOTE — HEMODIALYSIS
First treatment completed as ordered, Pt tolerated treatment well despite his constant restlessness  Catheter performance very good regarding ordered BFR        UF 1 0Kg    Pre weight 107 5 Kg

## 2019-07-30 NOTE — QUICK NOTE
Patient reported as encephalopathic and currently awaiting urgent line placement for hemodialysis  Palliative Care consult deferred at this time  Please re-consult as patient's encephalopathy improves or if medical condition deteriorates

## 2019-07-30 NOTE — NURSING NOTE
Pt is in obvious pain  No pain medication available, as his liver is not able to metabolize them  SLIM is aware of his pain and has advised that that pain meds will not be ordered

## 2019-07-30 NOTE — ASSESSMENT & PLAN NOTE
· Likely r/t #1  · No further episodes of hematemesis noted  · Hemoglobin stable around 10 range  · Currently on clear liquid per GI  · Continue ceftriaxone for SBP prophylaxis  · Continue IV Protonix b i d  Marcine Grow · GI following and plan for EGD once stable

## 2019-07-31 ENCOUNTER — APPOINTMENT (INPATIENT)
Dept: DIALYSIS | Facility: HOSPITAL | Age: 49
DRG: 280 | End: 2019-07-31
Payer: COMMERCIAL

## 2019-07-31 ENCOUNTER — APPOINTMENT (INPATIENT)
Dept: CT IMAGING | Facility: HOSPITAL | Age: 49
DRG: 280 | End: 2019-07-31
Payer: COMMERCIAL

## 2019-07-31 PROBLEM — K92.0 HEMATEMESIS: Status: ACTIVE | Noted: 2019-07-31

## 2019-07-31 PROBLEM — R74.01 TRANSAMINITIS: Status: ACTIVE | Noted: 2019-07-31

## 2019-07-31 PROBLEM — Z72.0 TOBACCO ABUSE: Status: ACTIVE | Noted: 2019-07-31

## 2019-07-31 PROBLEM — F10.10 ALCOHOL ABUSE: Status: ACTIVE | Noted: 2019-07-31

## 2019-07-31 PROBLEM — D64.9 ANEMIA: Status: ACTIVE | Noted: 2019-07-31

## 2019-07-31 LAB
ALBUMIN SERPL BCP-MCNC: 2.5 G/DL (ref 3.5–5)
ALP SERPL-CCNC: 290 U/L (ref 46–116)
ALT SERPL W P-5'-P-CCNC: 68 U/L (ref 12–78)
ANION GAP SERPL CALCULATED.3IONS-SCNC: 20 MMOL/L (ref 4–13)
AST SERPL W P-5'-P-CCNC: 208 U/L (ref 5–45)
ATRIAL RATE: 108 BPM
BASOPHILS # BLD AUTO: 0.02 THOUSANDS/ΜL (ref 0–0.1)
BASOPHILS NFR BLD AUTO: 0 % (ref 0–1)
BILIRUB DIRECT SERPL-MCNC: 38.53 MG/DL (ref 0–0.2)
BILIRUB SERPL-MCNC: 44.5 MG/DL (ref 0.2–1)
BUN SERPL-MCNC: 58 MG/DL (ref 5–25)
CALCIUM SERPL-MCNC: 7.3 MG/DL (ref 8.3–10.1)
CHLORIDE SERPL-SCNC: 95 MMOL/L (ref 100–108)
CO2 SERPL-SCNC: 20 MMOL/L (ref 21–32)
CREAT SERPL-MCNC: 6.6 MG/DL (ref 0.6–1.3)
EOSINOPHIL # BLD AUTO: 0.01 THOUSAND/ΜL (ref 0–0.61)
EOSINOPHIL NFR BLD AUTO: 0 % (ref 0–6)
ERYTHROCYTE [DISTWIDTH] IN BLOOD BY AUTOMATED COUNT: 25.5 % (ref 11.6–15.1)
GFR SERPL CREATININE-BSD FRML MDRD: 9 ML/MIN/1.73SQ M
GLUCOSE SERPL-MCNC: 128 MG/DL (ref 65–140)
HCT VFR BLD AUTO: 27.7 % (ref 36.5–49.3)
HCT VFR BLD AUTO: 29.4 % (ref 36.5–49.3)
HGB BLD-MCNC: 10.6 G/DL (ref 12–17)
HGB BLD-MCNC: 9.7 G/DL (ref 12–17)
HGB BLD-MCNC: 9.9 G/DL (ref 12–17)
IMM GRANULOCYTES # BLD AUTO: 0.34 THOUSAND/UL (ref 0–0.2)
IMM GRANULOCYTES NFR BLD AUTO: 2 % (ref 0–2)
INR PPP: 1.5 (ref 0.84–1.19)
LYMPHOCYTES # BLD AUTO: 0.62 THOUSANDS/ΜL (ref 0.6–4.47)
LYMPHOCYTES NFR BLD AUTO: 4 % (ref 14–44)
MAGNESIUM SERPL-MCNC: 2.2 MG/DL (ref 1.6–2.6)
MCH RBC QN AUTO: 34.4 PG (ref 26.8–34.3)
MCHC RBC AUTO-ENTMCNC: 35 G/DL (ref 31.4–37.4)
MCV RBC AUTO: 98 FL (ref 82–98)
MONOCYTES # BLD AUTO: 1.25 THOUSAND/ΜL (ref 0.17–1.22)
MONOCYTES NFR BLD AUTO: 7 % (ref 4–12)
NEUTROPHILS # BLD AUTO: 14.99 THOUSANDS/ΜL (ref 1.85–7.62)
NEUTS SEG NFR BLD AUTO: 87 % (ref 43–75)
NRBC BLD AUTO-RTO: 0 /100 WBCS
P AXIS: 57 DEGREES
PHOSPHATE SERPL-MCNC: 6.9 MG/DL (ref 2.7–4.5)
PLATELET # BLD AUTO: 230 THOUSANDS/UL (ref 149–390)
PMV BLD AUTO: 11.3 FL (ref 8.9–12.7)
POTASSIUM SERPL-SCNC: 3.9 MMOL/L (ref 3.5–5.3)
PR INTERVAL: 150 MS
PROT SERPL-MCNC: 5.3 G/DL (ref 6.4–8.2)
PROTHROMBIN TIME: 18.2 SECONDS (ref 11.6–14.5)
QRS AXIS: 59 DEGREES
QRSD INTERVAL: 98 MS
QT INTERVAL: 342 MS
QTC INTERVAL: 458 MS
RBC # BLD AUTO: 2.82 MILLION/UL (ref 3.88–5.62)
SODIUM SERPL-SCNC: 135 MMOL/L (ref 136–145)
T WAVE AXIS: 32 DEGREES
VENTRICULAR RATE: 108 BPM
WBC # BLD AUTO: 17.23 THOUSAND/UL (ref 4.31–10.16)

## 2019-07-31 PROCEDURE — 80048 BASIC METABOLIC PNL TOTAL CA: CPT | Performed by: PHYSICIAN ASSISTANT

## 2019-07-31 PROCEDURE — 85610 PROTHROMBIN TIME: CPT | Performed by: NURSE PRACTITIONER

## 2019-07-31 PROCEDURE — 80076 HEPATIC FUNCTION PANEL: CPT | Performed by: PHYSICIAN ASSISTANT

## 2019-07-31 PROCEDURE — G8979 MOBILITY GOAL STATUS: HCPCS

## 2019-07-31 PROCEDURE — 85025 COMPLETE CBC W/AUTO DIFF WBC: CPT | Performed by: PHYSICIAN ASSISTANT

## 2019-07-31 PROCEDURE — 99233 SBSQ HOSP IP/OBS HIGH 50: CPT | Performed by: ANESTHESIOLOGY

## 2019-07-31 PROCEDURE — G8978 MOBILITY CURRENT STATUS: HCPCS

## 2019-07-31 PROCEDURE — 83735 ASSAY OF MAGNESIUM: CPT | Performed by: PHYSICIAN ASSISTANT

## 2019-07-31 PROCEDURE — C9113 INJ PANTOPRAZOLE SODIUM, VIA: HCPCS | Performed by: NURSE PRACTITIONER

## 2019-07-31 PROCEDURE — 85018 HEMOGLOBIN: CPT | Performed by: NURSE PRACTITIONER

## 2019-07-31 PROCEDURE — 84100 ASSAY OF PHOSPHORUS: CPT | Performed by: PHYSICIAN ASSISTANT

## 2019-07-31 PROCEDURE — 70450 CT HEAD/BRAIN W/O DYE: CPT

## 2019-07-31 PROCEDURE — NC001 PR NO CHARGE: Performed by: NURSE PRACTITIONER

## 2019-07-31 PROCEDURE — 99232 SBSQ HOSP IP/OBS MODERATE 35: CPT | Performed by: INTERNAL MEDICINE

## 2019-07-31 PROCEDURE — 85014 HEMATOCRIT: CPT | Performed by: NURSE PRACTITIONER

## 2019-07-31 PROCEDURE — 93010 ELECTROCARDIOGRAM REPORT: CPT | Performed by: INTERNAL MEDICINE

## 2019-07-31 PROCEDURE — 97163 PT EVAL HIGH COMPLEX 45 MIN: CPT

## 2019-07-31 PROCEDURE — 99233 SBSQ HOSP IP/OBS HIGH 50: CPT | Performed by: INTERNAL MEDICINE

## 2019-07-31 RX ORDER — FOLIC ACID 1 MG/1
1 TABLET ORAL DAILY
Status: DISCONTINUED | OUTPATIENT
Start: 2019-07-31 | End: 2019-08-01 | Stop reason: HOSPADM

## 2019-07-31 RX ORDER — THIAMINE MONONITRATE (VIT B1) 100 MG
100 TABLET ORAL DAILY
Status: DISCONTINUED | OUTPATIENT
Start: 2019-07-31 | End: 2019-08-01 | Stop reason: HOSPADM

## 2019-07-31 RX ORDER — LACTULOSE 20 G/30ML
20 SOLUTION ORAL 2 TIMES DAILY
Status: DISCONTINUED | OUTPATIENT
Start: 2019-07-31 | End: 2019-08-01 | Stop reason: HOSPADM

## 2019-07-31 RX ORDER — CHLORDIAZEPOXIDE HYDROCHLORIDE 5 MG/1
10 CAPSULE, GELATIN COATED ORAL EVERY 8 HOURS SCHEDULED
Status: DISCONTINUED | OUTPATIENT
Start: 2019-07-31 | End: 2019-08-01

## 2019-07-31 RX ADMIN — CEFTRIAXONE SODIUM 1000 MG: 10 INJECTION, POWDER, FOR SOLUTION INTRAVENOUS at 07:26

## 2019-07-31 RX ADMIN — RIFAXIMIN 550 MG: 550 TABLET ORAL at 18:08

## 2019-07-31 RX ADMIN — CHLORDIAZEPOXIDE HYDROCHLORIDE 10 MG: 5 CAPSULE ORAL at 17:43

## 2019-07-31 RX ADMIN — HEPARIN SODIUM 5000 UNITS: 5000 INJECTION INTRAVENOUS; SUBCUTANEOUS at 06:08

## 2019-07-31 RX ADMIN — MIDODRINE HYDROCHLORIDE 10 MG: 5 TABLET ORAL at 12:39

## 2019-07-31 RX ADMIN — FOLIC ACID 1 MG: 1 TABLET ORAL at 08:46

## 2019-07-31 RX ADMIN — PREDNISOLONE SODIUM PHOSPHATE 40 MG: 15 SOLUTION ORAL at 08:48

## 2019-07-31 RX ADMIN — SODIUM CHLORIDE 75 ML/HR: 0.9 INJECTION, SOLUTION INTRAVENOUS at 14:50

## 2019-07-31 RX ADMIN — ALBUMIN (HUMAN) 25 G: 12.5 SOLUTION INTRAVENOUS at 17:48

## 2019-07-31 RX ADMIN — FLUTICASONE PROPIONATE 1 SPRAY: 50 SPRAY, METERED NASAL at 21:38

## 2019-07-31 RX ADMIN — SALINE NASAL SPRAY 1 SPRAY: 1.5 SOLUTION NASAL at 12:59

## 2019-07-31 RX ADMIN — CHLORHEXIDINE GLUCONATE 0.12% ORAL RINSE 15 ML: 1.2 LIQUID ORAL at 08:47

## 2019-07-31 RX ADMIN — MIDODRINE HYDROCHLORIDE 10 MG: 5 TABLET ORAL at 06:19

## 2019-07-31 RX ADMIN — SODIUM CHLORIDE 75 ML/HR: 0.9 INJECTION, SOLUTION INTRAVENOUS at 00:17

## 2019-07-31 RX ADMIN — PANTOPRAZOLE SODIUM 40 MG: 40 INJECTION, POWDER, FOR SOLUTION INTRAVENOUS at 08:47

## 2019-07-31 RX ADMIN — ALBUMIN (HUMAN) 25 G: 12.5 SOLUTION INTRAVENOUS at 08:43

## 2019-07-31 RX ADMIN — THIAMINE HCL TAB 100 MG 100 MG: 100 TAB at 08:46

## 2019-07-31 RX ADMIN — LACTULOSE 20 G: 10 SOLUTION ORAL at 17:44

## 2019-07-31 RX ADMIN — HEPARIN SODIUM 5000 UNITS: 5000 INJECTION INTRAVENOUS; SUBCUTANEOUS at 17:45

## 2019-07-31 RX ADMIN — OCTREOTIDE ACETATE 100 MCG: 100 INJECTION, SOLUTION INTRAVENOUS; SUBCUTANEOUS at 05:32

## 2019-07-31 RX ADMIN — LACTULOSE 20 G: 10 SOLUTION ORAL at 08:47

## 2019-07-31 RX ADMIN — NICOTINE 1 PATCH: 21 PATCH TRANSDERMAL at 09:15

## 2019-07-31 RX ADMIN — MELATONIN 6 MG: 3 TAB ORAL at 21:42
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
## 2019-07-31 NOTE — PROGRESS NOTES
Progress Note - Critical Care   Vita Bustillos 47 y o  male MRN: [id number]  Unit/Bed#:  Encounter: [id number]    Assessment:   Principal Problem:    Alcoholic cirrhosis ([facility] )  Active Problems:    Transaminitis    Encephalopathy    Hyperammonemia (HCC)    Hematemesis    Alcohol abuse    SOLO (acute kidney injury) ([facility] )    Hyponatremia    Tobacco abuse  Resolved Problems:    * No resolved hospital problems  *    Plan:   Neuro:   · Altered mental status likely multifactorial in setting of hepatic encephalopathy, uremia, and alcohol withdrawal   Low threshold for CTH  Monitor mental status closely  · Alcohol abuse/withdrawal   CIWA protocol  Seizure precautions  Daily thiamine, folate, multivitamin  Encourage cessation  Inpatient consult to case management to help facilitate inpatient versus outpatient treatment  · Delirium precautions  Regulate sleep-wake cycles  Daily CAM ICU  CV:   · Continue close hemodynamic monitoring  Lung:   · Maintain O2 saturations 92-94%  · Encourage good incentive spirometry/pulmonary toilet  · Tobacco abuse:  Encourage cessation  Nicoderm patch  GI:   · Alcoholic hepatitis:  Discriminant function    Prednisolone 40 mg daily (day 2/30)  Follow mental status and daily INR  Appreciate GI consultation  Encourage alcohol cessation  · Transaminitis:  Secondary to above  · Hyperammoniemia:  Lactulose 30 mg t i d (decreased to 20 bid)   Titrate for 2-3 BMs daily  Initiate Rifaximin   · Hematemesis:  H&H remained stable  Continue Protonix b i d   No known history of known varices  Will need endoscopy prior to discharge  FEN:   · Gentle IV fluid hydration isolates 25 mL/hour  · Monitor electrolytes  Replete as warranted  Mg> 2 0, K > 4 0, Phos 3 0  · Clear liquid diet  Advance tolerated  :   · SOLO suspect progression secondary to hepatorenal syndrome  Now on HD dependent  First treatment completed 7/30  Continue scheduled albumin q 6 hours    Octreotide gtt and Midodrine 10 mg t i d  · Appreciate Nephrology consultation  · Strict I&Os  · Trend daily BUN/creatinine  · Laurent-no  ID:   · Prophylactic coverage for SBP:  Ceftriaxone 1000 mg daily  · Trend fever curve and WBC count  Heme:   · H&H and platelets remained stable  Continue trend monitor  · ppx vte: sqh  Endo:   · No acute issues  Msk/Skin:   · Frequent offloading repositioning with skin breakdown  · PT/OT  Disposition:   · Step level 2    ______________________________________________________________________  Chief Complaint: " I want some tea"      HPI/24hr events:   Patient transferred to ICU for initiation of HD  Temporary right IJ dialysis catheter was placed  Patient underwent 1st dialysis session with out issues  Patient appropriate however impulse of overnight     ______________________________________________________________________  Physical Exam:     Physical Exam   Constitutional:   Chronically ill cachectic-appearing    Eyes: Pupils are equal, round, and reactive to light  EOM are normal  Scleral icterus is present  Cardiovascular: Normal rate and regular rhythm  Pulmonary/Chest: Effort normal and breath sounds normal  No stridor  No respiratory distress  He has no wheezes  Abdominal: Soft  Bowel sounds are normal  He exhibits distension  Genitourinary:   Genitourinary Comments: Prolapsed internal hemorrhoids  Musculoskeletal: He exhibits no edema  Neurological: He is alert  No cranial nerve deficit  Alert  Oriented to person and place  Confused to date  Intermittently follows commands  Skin: Skin is warm  Vitals reviewed      ______________________________________________________________________  Temperature:   Temp (24hrs), Av 9 °F (36 6 °C), Min:97 5 °F (36 4 °C), Max:98 1 °F (36 7 °C)    Current Temperature: 97 5 °F (36 4 °C)    Vitals:    19   BP: 144/97 134/84 131/81 110/72   BP Location:   Right arm Pulse: 97 104 102 100   Resp: 21 21    Temp: 97 5 °F (36 4 °C)      TempSrc: Oral      SpO2: 94%  95%    Weight:       Height:                  Weights:   IBW: 91 4 kg    Body mass index is 27 01 kg/m²  Weight (last 2 days)     Date/Time   Weight    07/30/19 0549   106 (233 69)    07/29/19 1500   106 (233)    07/29/19 0600   106 (233 69)    07/29/19 0505   106 (233 69)    07/29/19 0056   106 (233)            Height: 6' 6" (198 1 cm)  Intake and Outputs:  I/O       07/29 0701 - 07/30 0700 07/30 0701 - 07/31 0700    P  O  0     I V  (mL/kg) 2065 (19 5) 1025 (9 7)    Other  500    IV Piggyback  150    Total Intake(mL/kg) 2065 (19 5) 1675 (15 8)    Urine (mL/kg/hr) 125 (0) 100 (0)    Other  1400    Stool  0    Total Output 125 1500    Net +1940 +175          Unmeasured Stool Occurrence  1 x        Nutrition:        Diet Orders   (From admission, onward)             Start     Ordered    07/30/19 1355  Diet Clear Liquid  Diet effective now     Question Answer Comment   Diet Type Clear Liquid    RD to adjust diet per protocol? Yes        07/30/19 1354              Labs:   Results from last 7 days   Lab Units 07/31/19  0014 07/30/19  1859 07/30/19  1021  07/29/19  0108   WBC Thousand/uL  --   --  18 94*  --  22 71*   HEMOGLOBIN g/dL 10 6* 10 1* 10 2*   < > 11 4*   HEMATOCRIT % 29 4* 27 3* 28 6*   < > 32 8*   PLATELETS Thousands/uL  --   --  228  --  275   NEUTROS PCT %  --   --   --   --  88*   MONOS PCT %  --   --   --   --  6   MONO PCT %  --   --  5  --   --     < > = values in this interval not displayed        Results from last 7 days   Lab Units 07/30/19  1020 07/29/19  0108   POTASSIUM mmol/L 3 8 3 8   CHLORIDE mmol/L 95* 92*   CO2 mmol/L 15* 20*   BUN mg/dL 67* 46*   CREATININE mg/dL 7 74* 4 71*   CALCIUM mg/dL 7 2* 8 0*   ALK PHOS U/L 283* 327*   ALT U/L 56 70   AST U/L 160* 162*     Results from last 7 days   Lab Units 07/30/19  1020   MAGNESIUM mg/dL 2 2     Results from last 7 days   Lab Units 07/30/19  1020 PHOSPHORUS mg/dL 6 8*      Results from last 7 days   Lab Units 07/30/19  1005 07/29/19  0108   INR  1 48* 1 25*   PTT seconds  --  34     Results from last 7 days   Lab Units 07/29/19  0108   LACTIC ACID mmol/L 2 0     No results found for: TROPONINI  Imaging:  I have personally reviewed pertinent reports      EKG:   Micro:  Blood Culture:   Lab Results   Component Value Date    BLOODCX No Growth at 24 hrs  07/29/2019    BLOODCX No Growth at 24 hrs  07/29/2019     Urine Culture: No results found for: URINECX  Sputum Culture: No components found for: SPUTUMCX  Wound Culure: No results found for: [facility]     Lab Results   Component Value Date    BLOODCX No Growth at 24 hrs  07/29/2019    BLOODCX No Growth at 24 hrs  07/29/2019     Allergies: No Known Allergies  Medications:   Scheduled Meds:    Current Facility-Administered Medications:  acetaminophen 650 mg Oral Q8H PRN Ricky Poser, CRNP    albumin human 25 g Intravenous TID Ricky Gottir CRROSELINE Last Rate: Stopped (07/30/19 2201)   cefTRIAXone 1,000 mg Intravenous Q24H Ricky Gottir, CRNP Last Rate: 1,000 mg (07/30/19 0526)   chlorhexidine 15 mL Swish & Spit Q12H [facility] Ricky Poser, CRNP    fluticasone 1 spray Each Nare HS Ricky Gottir, CRNP    folic acid 1 mg, thiamine 100 mg in 0 9% sodium chloride 100 mL IVPB  Intravenous Daily Ricky Poser, CRNP Last Rate: 200 mL/hr at 07/30/19 1024   heparin (porcine) 5,000 Units Subcutaneous [facility] Ricky Poser, CRNP    lactulose 30 g Oral TID Ricky Gottir, CRNP    melatonin 6 mg Oral HS Ricky Poser, CRNP    midodrine 10 mg Oral TID AC Ricky Gottir, CRNP    nicotine 1 patch Transdermal Daily Ricky Gottir, CRNP    octreotide 100 mcg Intravenous [facility] Ricky Gottir, CRNP    ondansetron 4 mg Intravenous Q6H PRN Ricky Gottir, CRNP    pantoprazole 40 mg Intravenous Q12H Ricky Gottir, CRNP    prednisoLONE 40 mg Oral Daily Ricky Gottir, CRNP    simethicone 40 mg Oral Q6H PRN TREY Isaac sodium chloride 1 spray Each Nare Q1H PRN Jose Dash, CRNP    sodium chloride 75 mL/hr Intravenous Continuous Jose Dash, CRNP Last Rate: 75 mL/hr (07/31/19 0017)     Continuous Infusions:    sodium chloride 75 mL/hr Last Rate: 75 mL/hr (07/31/19 0017)     PRN Meds:    acetaminophen 650 mg Q8H PRN   ondansetron 4 mg Q6H PRN   simethicone 40 mg Q6H PRN   sodium chloride 1 spray Q1H PRN     VTE Pharmacologic Prophylaxis: Heparin  VTE Mechanical Prophylaxis: sequential compression device  Invasive lines and devices: Invasive Devices     Peripheral Intravenous Line            Peripheral IV 07/29/19 Left Antecubital 1 day    Peripheral IV 07/30/19 Dorsal (posterior); Right Wrist less than 1 day          Line            Temporary HD Catheter less than 1 day                   Counseling / Coordination of Care  Total Critical Care time spent 33 minutes excluding procedures, teaching and family updates  Code Status: Level 1 - Full Code    Portions of the record may have been created with voice recognition software  Occasional wrong word or "sound a like" substitutions may have occurred due to the inherent limitations of voice recognition software  Read the chart carefully and recognize, using context, where substitutions have occurred      Grey Irby PA-C

## 2019-07-31 NOTE — ASSESSMENT & PLAN NOTE
· Creatinine on admission 4 71  Worsened and peaked to 7 7  · Patient subsequently transferred to ICU for close monitoring while be started on dialysis and had temporary right IJ dialysis catheter placed on 07/30  · Patient tolerated to sessions of dialysis well and subsequently transferred to med surge floor  · Creatinine slightly improved currently at 6 05   · Initially suspected hepato renal syndrome due to cirrhosis, but there has been conflicting findings on ultrasound and CT imaging as well as since GI does not think patient has cirrhosis therefore etiology unclear for acute kidney injury  · Nephrology recommendation noted for further workup for glomerulonephritis and vasculitis  · Will monitor renal function and will continue dialysis tomorrow if renal function deteriorates  · Nephrology following

## 2019-07-31 NOTE — PROGRESS NOTES
Progress Note - ICU Transfer to SD/MS dusty Michel 47 y o  male MRN: [id number]  [address] [facility]   Unit/Bed#:  Encounter: [id number]    Code Status: Level 1 - Full Code  POA:    POLST:      Reason for ICU admission: HD start    Active problems:   Principal Problem:    Alcoholic cirrhosis ([facility] )  Active Problems:    Transaminitis    Encephalopathy    Hyperammonemia ([facility] )    Hematemesis    Alcohol abuse    SOLO (acute kidney injury) ([facility] )    Hyponatremia    Tobacco abuse    Anemia  Resolved Problems:    * No resolved hospital problems  *      Consultants:   Nephrology   GI    History of Present Illness:  Per H&P by JENNIFER Maravilla-"Vita Sen is a 47 y o  male with recently diagnosed cirrhosis who presents with increasing SOB and abdominal discomfort  Also report N/V with any PO intake  Was recently admitted to [address] x 2 weeks but left AMA on Friday  Not clear about work-up completed or results thereof  States became jaundiced with decreased appetite approximately 5 months ago after he quit drinking  Reports had been drinking 1 liter of Vodka daily prior to "going on probation " Also with remote history of heroine and crack-cocaine abuse  States has been clean x 5 years  Does state that he has had multiple syncopal episodes over the past 5 months  The most recent syncopal episode being on the day of admission  States "I woke up on the kitchen floor and everything looked strange "  Denies fever but reports frequent chills at night  Denies CHP/palpitations  Denies melena  States never saw a doctor prior to 5 months ago "    Summary of clinical course: The patient was transferred to the intensive care unit for hemodialysis catheter placement and new hemodialysis start  The patient underwent hemodialysis on 07/30 and did well  He is currently undergoing a 2nd dialysis treatment today  He is started on Librium for DT prophylaxis    He will also continue see while monitoring  He was noted to have a drop in hemoglobin this morning, and this may be secondary to hemorrhoidal bleeding which he had last night with his bowel movements  Will recheck a hemoglobin and follow  He does have a right IJ temporary hemodialysis catheter placed and this will need to be converted to a permanent hemodialysis catheter or removed if he no longer needs dialysis  Recent or scheduled procedures:   7/30 right IJ temporary hemodialysis catheter    Nutrition Plan:   Clear liquid    VTE Pharmacologic Prophylaxis: Heparin  VTE Mechanical Prophylaxis: sequential compression device     Specific Diagnosis Plan:  Please see updated [facility] note      Spoke with Dr Kay Candelario regarding transfer  Please call [phone] with any questions or concerns  Portions of the record may have been created with voice recognition software  Occasional wrong word or "sound a like" substitutions may have occurred due to the inherent limitations of voice recognition software  Read the chart carefully and recognize, using context, where substitutions have occurred      Antoinette Irizarry

## 2019-07-31 NOTE — ASSESSMENT & PLAN NOTE
59-year-old male past medical history of heavy alcohol use presented with severe alcoholic hepatitis, acute kidney injury, jaundice, abdominal distention, coffee-ground emesis, was recently admitted to [facility] and had left against medical advice on Friday  Reported lasting was on Saturday on 07/27/19  Reports Remote history of substance use with heroin and crack cocaine  Admits of selling drugs  Reportedly drank 1 liter of vodka daily up until 5 months ago when he "went on probation " Since then has become jaundiced with decreased appetite 2/2 N/V  Not clear about what work-up was done or findings at [facility]  Alcohol discriminant  score more than 32 indicating poor prognosis  Presented with bilirubin of 44 8 currently hovering around 40 range  Presented with creatinine of 4 7 which peaked to 7 7  Patient was subsequently transferred to step-down unit on 07/30 for close monitoring while being started on temporary dialysis via right IJ temporary catheter  Tolerated to sessions of dialysis well and subsequently transferred to [facility] under [facility] 07/31/19  Creatinine with minimal improvement and awaiting around 6 05 range  Patient had abdominal ultrasound noted for cirrhotic nodular liver  CT abdomen pelvis report noted for enlarged fatty liver but does not suggest cirrhosis  There has been conflicting finding on 2 imaging reports  Patient does have some stigmata of liver failure with scleral icterus, generalized jaundice, distended abdomen, caput medusa, spider angiomas, hepatic encephalopathy  Clinically GI does not think patient has cirrhosis since no splenomegaly, no thrombocytopenia, no symptoms of portal hypertension noted  08/1/19  On lactulose for hepatic encephalopathy there was noted earlier  Currently awake, alert, oriented x3  Chronically ill-appearing, nontoxic appearing    Patient had an episode of bowel movement with bright red blood per rectum likely due to hemorrhoidal bleed per GI  Hemoglobin stable  Started on prednisolone 40 mg daily for total 30 day course  Continue Protonix 40 mg IV b i d   Continue lactulose  Continue thiamine and folic supplement  Continue with [facility] protocol  NPO after midnight for EGD tomorrow  Continue to monitor hemoglobin and transfuse if below 7  Counseled on complete alcohol cessation  Overall prognosis guarded  GI following

## 2019-07-31 NOTE — ASSESSMENT & PLAN NOTE
Presented with hemoglobin around 10-11 range  Was noted to have significant hemorrhoidal bleeding with bowel movements overnight and this afternoon  Currently hemoglobin stable around 9-10 range  Denies coffee-ground emesis  Had an episode of bright red blood per rectum today with bowel movement  Suspected from like;y hemorrhoidal bleed per GI  Continue monitor CBC and transfuse if below 7  NPO after midnight  Continue IV Protonix 40 mg b i d  Plan for EGD tomorrow      GI following

## 2019-07-31 NOTE — PROGRESS NOTES
GI Progress Note - Vita Bustillos 47 y o  male MRN: [id number]    Unit/Bed#:  Encounter: [id number]    Subjective: Mr  Taryn Pennington denies any complaints  He wants to leave  He feels his abdominal distention has improved  He reports having a BM early this morning  Objective:     Vitals: Blood pressure 124/88, pulse 88, temperature 97 9 °F (36 6 °C), temperature source Oral, resp  rate (!) 24, height 6' 6" (1 981 m), weight 106 kg (233 lb 11 oz), SpO2 93 %  ,Body mass index is 27 01 kg/m²  Intake/Output Summary (Last 24 hours) at 7/31/2019 1016  Last data filed at 7/31/2019 0600  Gross per 24 hour   Intake 2423 75 ml   Output 1500 ml   Net 923 75 ml       Physical Exam:     General Appearance: Alert, oriented to self/place/year, jaundiced, appears chronically ill  Lungs: CTA bilaterally, no respiratory distress  Heart: RRR, no murmur  Abdomen: (+) Significant distention, tense, BS difficult to hear, mild generalized TTP  Extremities: No cyanosis or LE edema    Invasive Devices     Peripheral Intravenous Line            Peripheral IV 07/29/19 Left Antecubital 2 days    Peripheral IV 07/31/19 Left Forearm less than 1 day          Line            Temporary HD Catheter less than 1 day                Lab Results:  Results from last 7 days   Lab Units 07/31/19  0617   WBC Thousand/uL 17 23*   HEMOGLOBIN g/dL 9 7*   HEMATOCRIT % 27 7*   PLATELETS Thousands/uL 230   NEUTROS PCT % 87*   LYMPHS PCT % 4*   MONOS PCT % 7   EOS PCT % 0     Results from last 7 days   Lab Units 07/31/19  0617   POTASSIUM mmol/L 3 9   CHLORIDE mmol/L 95*   CO2 mmol/L 20*   BUN mg/dL 58*   CREATININE mg/dL 6 60*   CALCIUM mg/dL 7 3*   ALK PHOS U/L 290*   ALT U/L 68   AST U/L 208*     Results from last 7 days   Lab Units 07/31/19  0617   INR  1 50*     Results from last 7 days   Lab Units 07/29/19  0108   LIPASE u/L 788*       Imaging Studies: I have personally reviewed pertinent imaging studies        Ct Abdomen Pelvis Wo Contrast  Result Date: 7/29/2019  Impression: Fatty enlarged liver with ascites  No small bowel obstruction  Xr Abdomen Obstruction Series  Result Date: 7/29/2019  Impression: No evidence of bowel obstruction or ileus  Ct Head Wo Contrast  Result Date: 7/31/2019  Impression: No acute intracranial abnormality  White matter changes which are nonspecific but are statistically most likely to represent mild microangiopathic change in this patient with a history of smoking, renal disease, and cirrhosis  Us Abdomen Complete  Result Date: 7/30/2019  Impression: Cirrhotic nodular liver  Cholelithiasis with gallbladder wall thickening and pericholecystic fluid  Correlate clinically as this could be the result of cirrhotic liver and/or cholecystitis  HIDA scan may be useful  Mild to moderate right upper quadrant perihepatic ascites  Xr Chest 1 View  Result Date: 7/30/2019  Impression: Coarsened lung markings suggesting chronic lung changes  No focal infiltrate  Xr Chest Portable Icu  Result Date: 7/30/2019  Impression: Right IJ catheter terminates in SVC  No pneumothorax         Assessment and Plan:     Abdominal Distention  Coffee Ground Emesis  - Significant abdominal distention on admission, suspect this is 2/2 hepatomegaly and acute alcoholic hepatitis as imaging is negative for bowel obstruction/significant ascites   - Hb has remained relatively stable, will plan for EGD once he is stabilized further  - Protonix 40 mg IV BID  - Tylenol PRN, no more than 2 g daily  - Abdominal US shows cirrhotic liver measuring 26 cm, gallbladder wall thickening and pericholecystic fluid, possible adenomyomatosis, multiple dependent calculi  - Serial abdominal exams  - Diet as tolerated as nutrition is important for recovery from alcoholic hepatitis     Alcoholic Hepatitis  - Pt was drinking 1L vodka daily up until 5 months ago but has continued to drink a 30 pack of beer weekly, last drink on Saturday 7/27  - DF >32 indicating poor prognosis, continue prednisolone 40 mg daily for treatment, 30 day course total  - LFTs remaining relatively stable, trend LFTs and INR daily  - [facility] protocol  - MVI, thiamine, folic acid  - He does not have thrombocytopenia or splenomegaly on imaging to suggest cirrhosis, CT does not show cirrhosis but US suggests this     Leukocytosis  - WBC 22 71 on admission, afebrile and leukocytosis slowly improving  - Suspect this is 2/2 acute alcoholic hepatitis  - Blood cultures neg to date  - Continue ceftriaxone 1 g daily for SBP prophylaxis  - CXR without evidence of acute infiltrate, UA neg    SOLO  - Suspect this is pre-renal, could consider HRS but this only occurs in the setting of cirrhosis and this is controversial with conflicting evidence on imaging (lack of splenomegaly, thrombocytopenia)  - Nephrology following, started on dialysis and albumin/octreotide/midodrine       The patient will be seen by Dr Dana Coffey

## 2019-07-31 NOTE — HEMODIALYSIS
Second HD treatment completed with no adverse effect to Pt  Pt more cooperative with following commands  Very good catheter performance from catheter  Pre weight   Aretha Iveth Sharpana Iveth Sharpana Iveth 106 8kg    Post weight    105 3kg    UF  1 5 kg

## 2019-07-31 NOTE — ASSESSMENT & PLAN NOTE
· In the setting of alcoholic cirrhosis  · Ammonia on admission 73 noted to be 102  · Continue Lactulose PO BID  · Patient is having bowel movement  Currently oriented x3  · Continue rifaximin  · GI following

## 2019-07-31 NOTE — PROGRESS NOTES
NEPHROLOGY PROGRESS NOTE    Patient: Melquiades Faulkner               Sex: male          DOA: 7/29/2019 12:51 AM   YOB: 1970        Age:  47 y o         LOS:  LOS: 2 days       HPI     Patient admitted with acute kidney injury as well as acute liver failure        SUBJECTIVE       Still seems to be confused  Though awake    I had dialysis yesterday as part of the acute renal failure  Tolerated dialysis very well    Still has abdominal distention and still appear quite jaundice    Denies any other acute complaint        CURRENT MEDICATIONS       Current Facility-Administered Medications:     acetaminophen (TYLENOL) tablet 650 mg, 650 mg, Oral, Q8H PRN, TREY Sierra    albumin human (FLEXBUMIN) 25 % injection 25 g, 25 g, Intravenous, TID, TREY Sierra, Last Rate: 0 mL/hr at 07/30/19 2201, 25 g at 07/31/19 0843    ceftriaxone (ROCEPHIN) 1 g/50 mL in dextrose IVPB, 1,000 mg, Intravenous, Q24H, TREY Sierra, Last Rate: 100 mL/hr at 07/31/19 0726, 1,000 mg at 07/31/19 0726    chlorhexidine (PERIDEX) 0 12 % oral rinse 15 mL, 15 mL, Swish & Spit, Q12H [facility], TREY Sierra, 15 mL at 07/31/19 0847    fluticasone (FLONASE) 50 mcg/act nasal spray 1 spray, 1 spray, Each Nare, HS, TREY Sierra, 1 spray at 68/46/34 6476    folic acid (FOLVITE) tablet 1 mg, 1 mg, Oral, Daily, TREY Garcia, 1 mg at 07/31/19 0846    heparin (porcine) subcutaneous injection 5,000 Units, 5,000 Units, Subcutaneous, Q8H [facility], TREY Sierra, 5,000 Units at 07/31/19 0608    lactulose 20 g/30 mL oral solution 20 g, 20 g, Oral, BID, Alexey Wall PA-C, 20 g at 07/31/19 0847    melatonin tablet 6 mg, 6 mg, Oral, HS, TODD SierraNP, 6 mg at 07/30/19 2152    midodrine (PROAMATINE) tablet 10 mg, 10 mg, Oral, TID AC, Madison Recinos, CRNP, 10 mg at 07/31/19 7669    nicotine (NICODERM CQ) 21 mg/24 hr TD 24 hr patch 1 patch, 1 patch, Transdermal, Daily, Madison Recinos, TREY, 1 patch at 07/31/19 0915    octreotide (SandoSTATIN) injection 100 mcg, 100 mcg, Intravenous, Q8H [facility], TREY Butt, 100 mcg at 07/31/19 0532    ondansetron (ZOFRAN) injection 4 mg, 4 mg, Intravenous, Q6H PRN, TREY Butt, 4 mg at 07/30/19 1405    pantoprazole (PROTONIX) injection 40 mg, 40 mg, Intravenous, Q12H, TREY Butt, 40 mg at 07/31/19 0847    prednisoLONE (ORAPRED) 15 mg/5 mL oral solution 40 mg, 40 mg, Oral, Daily, TREY Butt, 40 mg at 07/31/19 0848    simethicone (MYLICON) oral suspension 40 mg, 40 mg, Oral, Q6H PRN, TREY Butt    sodium chloride (OCEAN) 0 65 % nasal spray 1 spray, 1 spray, Each Nare, Q1H PRN, TREY Butt, 1 spray at 07/30/19 2223    sodium chloride 0 9 % infusion, 75 mL/hr, Intravenous, Continuous, TREY Butt, Last Rate: 75 mL/hr at 07/31/19 0017, 75 mL/hr at 07/31/19 0017    thiamine (VITAMIN B1) tablet 100 mg, 100 mg, Oral, Daily, TODD GarciaNP, 100 mg at 07/31/19 0846    OBJECTIVE     Current Weight: Weight - Scale: 106 kg (233 lb 11 oz)  Vitals:    07/31/19 0823   BP: 124/88   Pulse: 88   Resp:    Temp:    SpO2:        Intake/Output Summary (Last 24 hours) at 7/31/2019 1028  Last data filed at 7/31/2019 0600  Gross per 24 hour   Intake 2423 75 ml   Output 1500 ml   Net 923 75 ml       PHYSICAL EXAMINATION     Physical Exam   Constitutional: He appears well-developed  No distress  HENT:   Head: Normocephalic  Mouth/Throat: Oropharynx is clear and moist    Eyes: Conjunctivae are normal  Scleral icterus is present  Neck: Neck supple  No JVD present  Cardiovascular: Normal rate, normal heart sounds and intact distal pulses  Pulmonary/Chest: Effort normal and breath sounds normal  He has no wheezes  Abdominal: Soft  He exhibits distension and mass  There is tenderness  Musculoskeletal: Normal range of motion  He exhibits no edema  Neurological: He is alert  Skin: Skin is warm  No rash noted          LAB RESULTS     Results from last 7 days   Lab Units 07/31/19  0617 07/31/19  0014 07/30/19  1859 07/30/19  1021 07/30/19  1020 07/29/19  2330 07/29/19  1827 07/29/19  1155  07/29/19  0108   WBC Thousand/uL 17 23*  --   --  18 94*  --   --   --   --   --  22 71*   HEMOGLOBIN g/dL 9 7* 10 6* 10 1* 10 2*  --  10 2* 10 2* 11 3*   < > 11 4*   HEMATOCRIT % 27 7* 29 4* 27 3* 28 6*  --  29 1* 28 8* 32 4*   < > 32 8*   PLATELETS Thousands/uL 230  --   --  228  --   --   --   --   --  275   POTASSIUM mmol/L 3 9  --   --   --  3 8  --   --   --   --  3 8   CHLORIDE mmol/L 95*  --   --   --  95*  --   --   --   --  92*   CO2 mmol/L 20*  --   --   --  15*  --   --   --   --  20*   BUN mg/dL 58*  --   --   --  67*  --   --   --   --  46*   CREATININE mg/dL 6 60*  --   --   --  7 74*  --   --   --   --  4 71*   EGFR ml/min/1 73sq m 9  --   --   --  7  --   --   --   --  13   CALCIUM mg/dL 7 3*  --   --   --  7 2*  --   --   --   --  8 0*   MAGNESIUM mg/dL 2 2  --   --   --  2 2  --   --   --   --   --    PHOSPHORUS mg/dL 6 9*  --   --   --  6 8*  --   --   --   --   --     < > = values in this interval not displayed  RADIOLOGY RESULTS      No results found for this or any previous visit  No results found for this or any previous visit  No results found for this or any previous visit  No results found for this or any previous visit  Results for orders placed during the hospital encounter of 07/29/19   CT abdomen pelvis wo contrast    Narrative CT ABDOMEN AND PELVIS WITHOUT IV CONTRAST    INDICATION:   vomiting, abdominal pain  COMPARISON:  None  TECHNIQUE:  CT examination of the abdomen and pelvis was performed without intravenous contrast   Axial, sagittal, and coronal 2D reformatted images were created from the source data and submitted for interpretation  Radiation dose length product (DLP) for this visit:  1602 mGy-cm     This examination, like all CT scans performed in the [facility], was performed utilizing techniques to minimize radiation dose exposure, including the use of iterative   reconstruction and automated exposure control  Enteric contrast was administered  FINDINGS:    ABDOMEN    LOWER CHEST:  Small hiatal hernia noted  No other clinically significant abnormality identified in the visualized lower chest     LIVER/BILIARY TREE:  Liver is diffusely decreased in density consistent with fatty change and additionally it is enlarged  No CT evidence of suspicious hepatic mass  Normal hepatic contours  No biliary dilatation  Metallic BB seen in the right lobe of the liver  GALLBLADDER:  There are gallstone(s) within the gallbladder, without pericholecystic inflammatory changes  SPLEEN:  Unremarkable  PANCREAS:  Unremarkable  ADRENAL GLANDS:  Unremarkable  KIDNEYS/URETERS:  Unremarkable  No hydronephrosis  STOMACH AND BOWEL:  There is colonic diverticulosis without evidence of acute diverticulitis  APPENDIX:  No findings to suggest appendicitis  ABDOMINOPELVIC CAVITY:  No ascites or free intraperitoneal air  No lymphadenopathy  VESSELS:  Unremarkable for patient's age  PELVIS    REPRODUCTIVE ORGANS:  Unremarkable for patient's age  URINARY BLADDER:  Unremarkable  ABDOMINAL WALL/INGUINAL REGIONS:  Unremarkable  OSSEOUS STRUCTURES:  No acute fracture or destructive osseous lesion  Impression Fatty enlarged liver with ascites  No small bowel obstruction  Workstation performed: [id number]       No results found for this or any previous visit  PLAN / RECOMMENDATIONS      Acute kidney injury:  Possible hepatorenal syndrome based on abnormal urine lytes and leg of improving with IV hydration  Dialysis was started yesterday because of worsening renal function and encephalopathy  He did tolerated well    CKD:  Patient possibly has CKD in the past based on high PTH and high phosphorus    Will continue to monitor    Altered mental status: He is encephalopathy with acute kidney injury as well as liver failure    Acute liver failure:  Possible related to alcohol  Discussed with GI possible to cirrhosis resulting in hepatorenal syndrome    Anemia:  Likely secondary to renal failure Will continue to monitor    Abdominal distension:  Possible ascites along with hepatomegaly    Overall prognosis guarded discussed with ICU team as well as Carmen oMrrell MD  Nephrology  7/31/2019        Portions of the record may have been created with voice recognition software  Occasional wrong word or "sound a like" substitutions may have occurred due to the inherent limitations of voice recognition software  Read the chart carefully and recognize, using context, where substitutions have occurred

## 2019-07-31 NOTE — ASSESSMENT & PLAN NOTE
Currently on Librium 10 mg t i d   Currently orient x3  Last reported drink was on Saturday 07/27/19  No signs of withdrawal noted  Continue thiamine, folic acid supplement  Currently on Librium 10 mg t i d   Taper Librium to 5 mg t i d   Continue [facility] protocol  Counseled on complete alcohol abstinence  Ines Monreal

## 2019-07-31 NOTE — ASSESSMENT & PLAN NOTE
Patient is on lactulose for hepatic encephalopathy  Having bowel movement with bright red blood per rectum  Hemoglobin has been stable  Currently awake, alert, oriented x3  Continue lactulose per GI recommendation

## 2019-07-31 NOTE — ASSESSMENT & PLAN NOTE
· May be 2/2 fluid-volume status in the setting of liver failure  · Check urine Na 12, urine osmolality 336 and serum osmo 281  · Currently resolved    · Nephrology following

## 2019-07-31 NOTE — PLAN OF CARE
Problem: PHYSICAL THERAPY ADULT  Goal: Performs mobility at highest level of function for planned discharge setting  See evaluation for individualized goals  Description  Treatment/Interventions: Functional transfer training, LE strengthening/ROM, Elevations, Therapeutic exercise, Endurance training, Patient/family training, Equipment eval/education, Bed mobility, Gait training, Spoke to nursing, Spoke to advanced practitioner, Spoke to MD          See flowsheet documentation for full assessment, interventions and recommendations  Note:   Prognosis: Good  Problem List: Decreased strength, Impaired balance, Decreased endurance, Decreased mobility, Impaired judgement, Decreased safety awareness  Assessment: pt is a 52y/o m who presents to [facility] c alcholic cirrhosis  PMH significant for SOLO, encephalopathy, alcohol abuse, + tobacco abuse  at baseline, pt (I) c functional mobility tasks s AD  resides c roomate in ranch home c 4 HAR[place]  pt currently requires min (A)x2 to complete OOB mobility tasks 2* deficits in strength, balance, gait quality, safety awareness, + activity tolerance noted in PT exam above  Barthel Index 40/100  mod verbal cues required for safe technique 2* impulsivity  ambulated 5' min (A)x2 c pt refusing use of RW  would benefit from skilled PT to maximize functional mobility + improve quality of life  upon d/c, recommend STR  PT eval of high complexity 2* unstable med status c pt requiring ongoing medical management 2* alcoholic cirrhosis  pt admits to consuming 1L vodka/day  pt c multiple co-morbidities + mobility deficits noted above impacting PT  pt c limited support system at home  resides in ranch home c 4 [place]  pt c poor safety awareness + impulsivity  Barriers to Discharge: Inaccessible home environment, Decreased caregiver support     Recommendation: Short-term skilled PT     PT - OK to Discharge: Yes(to STR when stable )    See flowsheet documentation for full assessment

## 2019-08-01 VITALS
BODY MASS INDEX: 28.06 KG/M2 | SYSTOLIC BLOOD PRESSURE: 110 MMHG | HEIGHT: 78 IN | DIASTOLIC BLOOD PRESSURE: 75 MMHG | WEIGHT: 242.51 LBS | RESPIRATION RATE: 18 BRPM | OXYGEN SATURATION: 94 % | TEMPERATURE: 98.1 F | HEART RATE: 101 BPM

## 2019-08-01 PROBLEM — K70.10 ALCOHOLIC HEPATITIS: Status: ACTIVE | Noted: 2019-07-29

## 2019-08-01 LAB
ALBUMIN SERPL BCP-MCNC: 2.6 G/DL (ref 3.5–5)
ALP SERPL-CCNC: 291 U/L (ref 46–116)
ALT SERPL W P-5'-P-CCNC: 66 U/L (ref 12–78)
ANION GAP SERPL CALCULATED.3IONS-SCNC: 16 MMOL/L (ref 4–13)
AST SERPL W P-5'-P-CCNC: 202 U/L (ref 5–45)
BACTERIA UR QL AUTO: ABNORMAL /HPF
BILIRUB DIRECT SERPL-MCNC: 31.34 MG/DL (ref 0–0.2)
BILIRUB SERPL-MCNC: 40.3 MG/DL (ref 0.2–1)
BILIRUB UR QL STRIP: ABNORMAL
BUN SERPL-MCNC: 56 MG/DL (ref 5–25)
C3 SERPL-MCNC: 93.5 MG/DL (ref 90–180)
C4 SERPL-MCNC: 8 MG/DL (ref 10–40)
CALCIUM SERPL-MCNC: 7.3 MG/DL (ref 8.3–10.1)
CHLORIDE SERPL-SCNC: 96 MMOL/L (ref 100–108)
CLARITY UR: ABNORMAL
CO2 SERPL-SCNC: 23 MMOL/L (ref 21–32)
COLOR UR: ABNORMAL
CREAT SERPL-MCNC: 6.05 MG/DL (ref 0.6–1.3)
ERYTHROCYTE [DISTWIDTH] IN BLOOD BY AUTOMATED COUNT: 26 % (ref 11.6–15.1)
GFR SERPL CREATININE-BSD FRML MDRD: 10 ML/MIN/1.73SQ M
GLUCOSE SERPL-MCNC: 122 MG/DL (ref 65–140)
GLUCOSE UR STRIP-MCNC: ABNORMAL MG/DL
HCT VFR BLD AUTO: 26.9 % (ref 36.5–49.3)
HCT VFR BLD AUTO: 30 % (ref 36.5–49.3)
HGB BLD-MCNC: 10.5 G/DL (ref 12–17)
HGB BLD-MCNC: 9.4 G/DL (ref 12–17)
HGB UR QL STRIP.AUTO: ABNORMAL
HYALINE CASTS #/AREA URNS LPF: ABNORMAL /LPF
INR PPP: 1.69 (ref 0.84–1.19)
KETONES UR STRIP-MCNC: ABNORMAL MG/DL
LEUKOCYTE ESTERASE UR QL STRIP: ABNORMAL
MCH RBC QN AUTO: 34.6 PG (ref 26.8–34.3)
MCHC RBC AUTO-ENTMCNC: 34.9 G/DL (ref 31.4–37.4)
MCV RBC AUTO: 99 FL (ref 82–98)
NITRITE UR QL STRIP: NEGATIVE
NON-SQ EPI CELLS URNS QL MICRO: ABNORMAL /HPF
PH UR STRIP.AUTO: 5.5 [PH]
PLATELET # BLD AUTO: 249 THOUSANDS/UL (ref 149–390)
PMV BLD AUTO: 11.7 FL (ref 8.9–12.7)
POTASSIUM SERPL-SCNC: 3.6 MMOL/L (ref 3.5–5.3)
PROT SERPL-MCNC: 5.1 G/DL (ref 6.4–8.2)
PROT UR STRIP-MCNC: ABNORMAL MG/DL
PROTHROMBIN TIME: 20 SECONDS (ref 11.6–14.5)
RBC # BLD AUTO: 2.72 MILLION/UL (ref 3.88–5.62)
RBC #/AREA URNS AUTO: ABNORMAL /HPF
SODIUM SERPL-SCNC: 135 MMOL/L (ref 136–145)
SP GR UR STRIP.AUTO: 1.02 (ref 1–1.03)
UROBILINOGEN UR QL STRIP.AUTO: 1 E.U./DL
WBC # BLD AUTO: 16.86 THOUSAND/UL (ref 4.31–10.16)
WBC #/AREA URNS AUTO: ABNORMAL /HPF

## 2019-08-01 PROCEDURE — 99232 SBSQ HOSP IP/OBS MODERATE 35: CPT | Performed by: INTERNAL MEDICINE

## 2019-08-01 PROCEDURE — 86038 ANTINUCLEAR ANTIBODIES: CPT | Performed by: INTERNAL MEDICINE

## 2019-08-01 PROCEDURE — 81001 URINALYSIS AUTO W/SCOPE: CPT | Performed by: INTERNAL MEDICINE

## 2019-08-01 PROCEDURE — 83935 ASSAY OF URINE OSMOLALITY: CPT | Performed by: INTERNAL MEDICINE

## 2019-08-01 PROCEDURE — 86430 RHEUMATOID FACTOR TEST QUAL: CPT | Performed by: INTERNAL MEDICINE

## 2019-08-01 PROCEDURE — 99238 HOSP IP/OBS DSCHRG MGMT 30/<: CPT | Performed by: STUDENT IN AN ORGANIZED HEALTH CARE EDUCATION/TRAINING PROGRAM

## 2019-08-01 PROCEDURE — 82436 ASSAY OF URINE CHLORIDE: CPT | Performed by: INTERNAL MEDICINE

## 2019-08-01 PROCEDURE — 84166 PROTEIN E-PHORESIS/URINE/CSF: CPT | Performed by: PATHOLOGY

## 2019-08-01 PROCEDURE — 84165 PROTEIN E-PHORESIS SERUM: CPT | Performed by: INTERNAL MEDICINE

## 2019-08-01 PROCEDURE — G8988 SELF CARE GOAL STATUS: HCPCS

## 2019-08-01 PROCEDURE — G0515 COGNITIVE SKILLS DEVELOPMENT: HCPCS

## 2019-08-01 PROCEDURE — 82570 ASSAY OF URINE CREATININE: CPT | Performed by: INTERNAL MEDICINE

## 2019-08-01 PROCEDURE — 85610 PROTHROMBIN TIME: CPT | Performed by: NURSE PRACTITIONER

## 2019-08-01 PROCEDURE — G8987 SELF CARE CURRENT STATUS: HCPCS

## 2019-08-01 PROCEDURE — 5A1D70Z PERFORMANCE OF URINARY FILTRATION, INTERMITTENT, LESS THAN 6 HOURS PER DAY: ICD-10-PCS | Performed by: STUDENT IN AN ORGANIZED HEALTH CARE EDUCATION/TRAINING PROGRAM

## 2019-08-01 PROCEDURE — 85027 COMPLETE CBC AUTOMATED: CPT | Performed by: NURSE PRACTITIONER

## 2019-08-01 PROCEDURE — 80048 BASIC METABOLIC PNL TOTAL CA: CPT | Performed by: NURSE PRACTITIONER

## 2019-08-01 PROCEDURE — 86160 COMPLEMENT ANTIGEN: CPT | Performed by: INTERNAL MEDICINE

## 2019-08-01 PROCEDURE — 84166 PROTEIN E-PHORESIS/URINE/CSF: CPT | Performed by: INTERNAL MEDICINE

## 2019-08-01 PROCEDURE — 84165 PROTEIN E-PHORESIS SERUM: CPT | Performed by: PATHOLOGY

## 2019-08-01 PROCEDURE — 94762 N-INVAS EAR/PLS OXIMTRY CONT: CPT

## 2019-08-01 PROCEDURE — 97167 OT EVAL HIGH COMPLEX 60 MIN: CPT

## 2019-08-01 PROCEDURE — 84300 ASSAY OF URINE SODIUM: CPT | Performed by: INTERNAL MEDICINE

## 2019-08-01 PROCEDURE — 85018 HEMOGLOBIN: CPT | Performed by: INTERNAL MEDICINE

## 2019-08-01 PROCEDURE — 99233 SBSQ HOSP IP/OBS HIGH 50: CPT | Performed by: INTERNAL MEDICINE

## 2019-08-01 PROCEDURE — 86255 FLUORESCENT ANTIBODY SCREEN: CPT | Performed by: INTERNAL MEDICINE

## 2019-08-01 PROCEDURE — 85014 HEMATOCRIT: CPT | Performed by: INTERNAL MEDICINE

## 2019-08-01 PROCEDURE — 82595 ASSAY OF CRYOGLOBULIN: CPT | Performed by: INTERNAL MEDICINE

## 2019-08-01 PROCEDURE — 80076 HEPATIC FUNCTION PANEL: CPT | Performed by: PHYSICIAN ASSISTANT

## 2019-08-01 RX ORDER — PANTOPRAZOLE SODIUM 40 MG/1
40 TABLET, DELAYED RELEASE ORAL
Status: DISCONTINUED | OUTPATIENT
Start: 2019-08-01 | End: 2019-08-01 | Stop reason: HOSPADM

## 2019-08-01 RX ORDER — CHLORDIAZEPOXIDE HYDROCHLORIDE 5 MG/1
5 CAPSULE, GELATIN COATED ORAL EVERY 8 HOURS SCHEDULED
Status: DISCONTINUED | OUTPATIENT
Start: 2019-08-01 | End: 2019-08-01 | Stop reason: HOSPADM

## 2019-08-01 RX ADMIN — CHLORDIAZEPOXIDE HYDROCHLORIDE 10 MG: 5 CAPSULE ORAL at 09:23

## 2019-08-01 RX ADMIN — ALBUMIN (HUMAN) 25 G: 12.5 SOLUTION INTRAVENOUS at 00:25

## 2019-08-01 RX ADMIN — RIFAXIMIN 550 MG: 550 TABLET ORAL at 09:19

## 2019-08-01 RX ADMIN — PANTOPRAZOLE SODIUM 40 MG: 40 TABLET, DELAYED RELEASE ORAL at 09:18

## 2019-08-01 RX ADMIN — CEFTRIAXONE SODIUM 1000 MG: 10 INJECTION, POWDER, FOR SOLUTION INTRAVENOUS at 05:21

## 2019-08-01 RX ADMIN — ALBUMIN (HUMAN) 25 G: 12.5 SOLUTION INTRAVENOUS at 09:18

## 2019-08-01 RX ADMIN — LACTULOSE 20 G: 10 SOLUTION ORAL at 17:00

## 2019-08-01 RX ADMIN — SIMETHICONE 40 MG: 20 SUSPENSION/ DROPS ORAL at 16:48

## 2019-08-01 RX ADMIN — SALINE NASAL SPRAY 1 SPRAY: 1.5 SOLUTION NASAL at 09:34

## 2019-08-01 RX ADMIN — CHLORDIAZEPOXIDE HYDROCHLORIDE 10 MG: 5 CAPSULE ORAL at 00:30

## 2019-08-01 RX ADMIN — NICOTINE 1 PATCH: 21 PATCH TRANSDERMAL at 09:19

## 2019-08-01 RX ADMIN — HEPARIN SODIUM 5000 UNITS: 5000 INJECTION INTRAVENOUS; SUBCUTANEOUS at 06:30

## 2019-08-01 RX ADMIN — LACTULOSE 20 G: 10 SOLUTION ORAL at 09:18

## 2019-08-01 RX ADMIN — PANTOPRAZOLE SODIUM 40 MG: 40 TABLET, DELAYED RELEASE ORAL at 16:49

## 2019-08-01 RX ADMIN — THIAMINE HCL TAB 100 MG 100 MG: 100 TAB at 09:18

## 2019-08-01 RX ADMIN — SODIUM CHLORIDE 75 ML/HR: 0.9 INJECTION, SOLUTION INTRAVENOUS at 13:27

## 2019-08-01 RX ADMIN — ONDANSETRON 4 MG: 2 INJECTION INTRAMUSCULAR; INTRAVENOUS at 09:42

## 2019-08-01 RX ADMIN — CHLORDIAZEPOXIDE HYDROCHLORIDE 5 MG: 5 CAPSULE ORAL at 17:01

## 2019-08-01 RX ADMIN — FOLIC ACID 1 MG: 1 TABLET ORAL at 09:19

## 2019-08-01 RX ADMIN — SODIUM CHLORIDE 75 ML/HR: 0.9 INJECTION, SOLUTION INTRAVENOUS at 00:25

## 2019-08-01 NOTE — ASSESSMENT & PLAN NOTE
Patient had initially presented with coffee-ground emesis  No further episode noted  Continue IV Protonix 40 mg b i d   NPO after midnight  Plan for EGD tomorrow

## 2019-08-01 NOTE — NURSING NOTE
Pt meets criteria to discontinue tele   In light of rectal bleeding, Dr Gabriel Barrow texted to see if wishes to continue telemetry monitoring    Await response

## 2019-08-01 NOTE — PROGRESS NOTES
The pantoprazole has / have been converted to Oral [facility] for Adults as approved by the Pharmacy and Therapeutics Committee  The patient met all eligible criteria:  3 Age = 25years old   2) Received at least one dose of the IV form   3) Receiving at least one other scheduled oral/enteral medication   4) Tolerating an oral/enteral diet   and did not have any exclusions:   1) Critical care patient   2) Active GI bleed (IF assessing H2RAs or PPIs)   3) Continuous tube feeding (IF assessing cipro, doxycycline, levofloxacin, minocycline, rifampin, or voriconazole)   4) Receiving PO vancomycin (IF assessing metronidazole)   5) Persistent nausea and/or vomiting   6) Ileus or gastrointestinal obstruction   7) Yosef/nasogastric tube set for continuous suction   8) Specific order not to automatically convert to PO (in the order's comments or if discussed in the most recent Infectious Disease or primary team's progress notes)

## 2019-08-01 NOTE — PLAN OF CARE
Problem: OCCUPATIONAL THERAPY ADULT  Goal: Performs self-care activities at highest level of function for planned discharge setting  See evaluation for individualized goals  Description  Treatment Interventions: ADL retraining, Functional transfer training, Endurance training, Cognitive reorientation, Patient/family training, Compensatory technique education, Continued evaluation, Energy conservation, Activityengagement          See flowsheet documentation for full assessment, interventions and recommendations  Note:   Limitation: Decreased ADL status, Decreased Safe judgement during ADL, Decreased cognition, Decreased endurance, Decreased high-level ADLs, Decreased self-care trans     Assessment: Pt is a 47 y o  male seen for OT evaluation ([id number]) s/p admit to [facility] on 1/46/6278 w/ Alcoholic cirrhosis ([facility] [address] )  Comorbidities affecting pt's functional performance at time of assessment include: hyponatremia, SOLO, hyperammonemia, encephalopathy, hematemesis, transaminitis, alcohol abuse, tobacco abuse, anemia  Personal factors affecting pt at time of IE include:steps to enter environment, limited home support, difficulty performing ADLS, difficulty performing IADLS , limited insight into deficits, compliance, decreased initiation and engagement , health management  and environment  Prior to admission, pt was independent with ADLs and IADLs, with exception of driving (roommate drives pt), and uses SPC for functional mobility PRN  Roommate present for evaluation and reports that pt is home alone at times   Upon evaluation: Pt requires supervision for grooming and UB ADLs, min-mod A for LB ADLs, min a for toileting for transfer, min A x 1 for functional mobility and sit <> stand transfers without AD 2* the following deficits impacting occupational performance: decreased balance, decreased tolerance, impaired attention, impaired memory, impaired problem solving, impulsivity, decreased safety awareness and decreased coping skills  Cognition appears functional however questionable for impairments, will continue to assess  Pt to benefit from continued skilled OT tx while in the hospital to address deficits as defined above and maximize level of functional independence w ADL's and functional mobility  Occupational Performance areas to address include: grooming, bathing/shower, toilet hygiene, dressing, medication management, socialization, health maintenance, functional mobility, community mobility, clothing management, cleaning, meal prep and household maintenance  From OT standpoint, recommendation at time of d/c would be to post acute rehab once medically cleared  Tx assessment: Patient participated in Skilled OT session this date ([phone]) with interventions consisting of  continued cognitive assessment*   Performed short-blessed test to screen pt for cognitive deficits in need of further evaluation  Pt scored 5/28 indicating there is a questionable cognitive impairment  Pt had errors when saying months of the year backwards  However important to consider personality components that may have been contributing to pt errors  Pt reported that he intentionally left out February once it was pointed out by this writer that he missed a month  Additionally despite having pt read the clock correctly shortly before completing this screen, pt was unable to estimate the current time  Pt stated that it was "about 60 920 56 25" however the time was 12:30  For the recall section pt was able to correctly state a name and address told to him at start of screen, however pt required VCs to slow pacing when reporting back name and address in order to state it clearly and correctly  Patient continues to be functioning below baseline level, occupational performance remains limited secondary to factors listed above and increased risk for falls and injury     From OT standpoint, recommendation at time of d/c would be to post acute rehab once medically cleared  Patient to benefit from continued Occupational Therapy treatment while in the hospital to address deficits as defined above and maximize level of functional independence with ADLs and functional mobility        OT Discharge Recommendation: Other (Comment)(to post acute rehab )  OT - OK to Discharge: (to post acute rehab once medically cleared)
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
## 2019-08-01 NOTE — PROGRESS NOTES
Progress Note - Vita Tewfick 1970, 47 y o  male MRN: [id number]    Unit/Bed#: -[facility]rick Encounter: [id number]    Primary Care Provider: Liyah Yousif MD   Date and time admitted to hospital: 7/29/2019 12:51 AM        * Alcoholic hepatitis  Assessment & Plan  67-year-old male past medical history of heavy alcohol use presented with severe alcoholic hepatitis, acute kidney injury, jaundice, abdominal distention, coffee-ground emesis, was recently admitted to [facility] and had left against medical advice on Friday  Reported lasting was on Saturday on 07/27/19  Reports Remote history of substance use with heroin and crack cocaine  Admits of selling drugs  Reportedly drank 1 liter of vodka daily up until 5 months ago when he "went on probation " Since then has become jaundiced with decreased appetite 2/2 N/V  Not clear about what work-up was done or findings at [place]  Alcohol discriminant  score more than 32 indicating poor prognosis  Presented with bilirubin of 44 8 currently hovering around 40 range  Presented with creatinine of 4 7 which peaked to 7 7  Patient was subsequently transferred to step-down unit on 07/30 for close monitoring while being started on temporary dialysis via right IJ temporary catheter  Tolerated to sessions of dialysis well and subsequently transferred to [facility] under sl services 07/31/19  Creatinine with minimal improvement and awaiting around 6 05 range  Patient had abdominal ultrasound noted for cirrhotic nodular liver  CT abdomen pelvis report noted for enlarged fatty liver but does not suggest cirrhosis  There has been conflicting finding on 2 imaging reports  Patient does have some stigmata of liver failure with scleral icterus, generalized jaundice, distended abdomen, caput medusa, spider angiomas, hepatic encephalopathy    Clinically GI does not think patient has cirrhosis since no splenomegaly, no thrombocytopenia, no symptoms of portal hypertension noted  08/1/19  On lactulose for hepatic encephalopathy there was noted earlier  Currently awake, alert, oriented x3  Chronically ill-appearing, nontoxic appearing  Patient had an episode of bowel movement with bright red blood per rectum likely due to hemorrhoidal bleed per GI  Hemoglobin stable  Started on prednisolone 40 mg daily for total 30 day course  Continue Protonix 40 mg IV b i d   Continue lactulose  Continue thiamine and folic supplement  Continue with CIWA protocol  NPO after midnight for EGD tomorrow  Continue to monitor hemoglobin and transfuse if below 7  Counseled on complete alcohol cessation  Overall prognosis guarded  GI following  SOLO (acute kidney injury) ([facility] [address] )  Assessment & Plan  · Creatinine on admission 4 71  Worsened and peaked to 7 7  · Patient subsequently transferred to ICU for close monitoring while be started on dialysis and had temporary right IJ dialysis catheter placed on 07/30  · Patient tolerated to sessions of dialysis well and subsequently transferred to med surge floor  · Creatinine slightly improved currently at 6 05   · Initially suspected hepato renal syndrome due to cirrhosis, but there has been conflicting findings on ultrasound and CT imaging as well as since GI does not think patient has cirrhosis therefore etiology unclear for acute kidney injury  · Nephrology recommendation noted for further workup for glomerulonephritis and vasculitis  · Will monitor renal function and will continue dialysis tomorrow if renal function deteriorates  · Nephrology following  Alcohol abuse  Assessment & Plan  Currently on Librium 10 mg t i d   Currently orient x3  Last reported drink was on Saturday 07/27/19  No signs of withdrawal noted  Continue thiamine, folic acid supplement  Currently on Librium 10 mg t i d   Taper Librium to 5 mg t i d   Continue CIWA protocol  Counseled on complete alcohol abstinence       Encephalopathy  Assessment & Plan  Patient is on lactulose for hepatic encephalopathy  Having bowel movement with bright red blood per rectum  Hemoglobin has been stable  Currently awake, alert, oriented x3  Continue lactulose per GI recommendation  Anemia  Assessment & Plan  Presented with hemoglobin around 10-11 range  Was noted to have significant hemorrhoidal bleeding with bowel movements overnight and this afternoon  Currently hemoglobin stable around 9-10 range  Denies coffee-ground emesis  Had an episode of bright red blood per rectum today with bowel movement  Suspected from like;y hemorrhoidal bleed per GI  Continue monitor CBC and transfuse if below 7  NPO after midnight  Continue IV Protonix 40 mg b i d  Plan for EGD tomorrow  GI following    Tobacco abuse  Assessment & Plan  Counseled on cessation  Transaminitis  Assessment & Plan  Transaminitis noted likely due to severe alcoholic hepatitis  Currently on prednisolone 40 mg daily for total started course  Continue to trend LFTs and INR  Daily    Hematemesis  Assessment & Plan  Patient had initially presented with coffee-ground emesis  No further episode noted  Continue IV Protonix 40 mg b i d   NPO after midnight  Plan for EGD tomorrow  Hyperammonemia (HCC)  Assessment & Plan  · In the setting of alcoholic cirrhosis  · Ammonia on admission 73 noted to be 102  · Continue Lactulose PO BID  · Patient is having bowel movement  Currently oriented x3  · Continue rifaximin  · GI following  Hyponatremia  Assessment & Plan  · May be 2/2 fluid-volume status in the setting of liver failure  · Check urine Na 12, urine osmolality 336 and serum osmo 281  · Currently resolved  · Nephrology following      VTE Pharmacologic Prophylaxis:   Pharmacologic: Pharmacologic VTE Prophylaxis contraindicated due to Bright red blood per rectum  presented with coffee-ground emesis      Patient Centered Rounds: I have performed bedside rounds with nursing staff today     Discussions with Specialists or Other Care Team Provider:  Nephrology, gastroenterology  Education and Discussions with Family / Patient:  Patient    Time Spent for Care: 20 minutes  More than 50% of total time spent on counseling and coordination of care as described above  Current Length of Stay: 3 day(s)    Current Patient Status: Inpatient   Certification Statement: The patient will continue to require additional inpatient hospital stay due to Plan for EGD tomorrow  Renal function not improving requires further inpatient care  Discharge Plan: tbd    Code Status: Level 1 - Full Code      Subjective:   Currently patient oriented x3  Reports having bowel movement and bright red blood per rectum likely hemorrhoidal   Denies further episodes of coffee-ground emesis, chest pain, fever, chills, nausea, vomiting, any other new complaints  Plan for EGD tomorrow  Objective:     Vitals:   Temp (24hrs), Av 5 °F (36 4 °C), Min:97 2 °F (36 2 °C), Max:98 2 °F (36 8 °C)    Temp:  [97 2 °F (36 2 °C)-98 2 °F (36 8 °C)] 97 3 °F (36 3 °C)  HR:  [84-99] 99  Resp:  [17-22] 18  BP: ()/(65-79) 110/74  SpO2:  [91 %-97 %] 91 %  Body mass index is 28 02 kg/m²  Input and Output Summary (last 24 hours): Intake/Output Summary (Last 24 hours) at 2019 1700  Last data filed at 2019 1327  Gross per 24 hour   Intake 1077 5 ml   Output    Net 1077 5 ml       Physical Exam:     Physical Exam   Constitutional: No distress  Chronically ill-appearing, nontoxic appearing  HENT:   Head: Normocephalic and atraumatic  Eyes: Pupils are equal, round, and reactive to light  EOM are normal  Scleral icterus is present  Neck: Normal range of motion  Neck supple  Cardiovascular: Normal rate and regular rhythm  Pulmonary/Chest: Effort normal and breath sounds normal  No stridor  No respiratory distress  Abdominal: Soft  Bowel sounds are normal  He exhibits distension  There is no tenderness  There is no guarding  Distended abdominal vessels noted  Musculoskeletal: Normal range of motion  Neurological:   Patient appears drowsy  Wakes up with verbal stimuli but appears confused  Oriented to self, place  Skin: He is not diaphoretic  Generalized jaundice noted  Multiple spider angioma noted on his chest         Additional Data:     Labs:    Results from last 7 days   Lab Units 08/01/19  1423 08/01/19  0522  07/31/19  0617  07/30/19  1021   WBC Thousand/uL  --  16 86*  --  17 23*  --  18 94*   HEMOGLOBIN g/dL 10 5* 9 4*   < > 9 7*   < > 10 2*   HEMATOCRIT % 30 0* 26 9*  --  27 7*   < > 28 6*   PLATELETS Thousands/uL  --  249  --  230  --  228   BANDS PCT %  --   --   --   --   --  5   NEUTROS PCT %  --   --   --  87*  --   --    LYMPHS PCT %  --   --   --  4*  --   --    LYMPHO PCT %  --   --   --   --   --  6*   MONOS PCT %  --   --   --  7  --   --    MONO PCT %  --   --   --   --   --  5   EOS PCT %  --   --   --  0  --  1    < > = values in this interval not displayed  Results from last 7 days   Lab Units 08/01/19  0522   SODIUM mmol/L 135*   POTASSIUM mmol/L 3 6   CHLORIDE mmol/L 96*   CO2 mmol/L 23   BUN mg/dL 56*   CREATININE mg/dL 6 05*   ANION GAP mmol/L 16*   CALCIUM mg/dL 7 3*   ALBUMIN g/dL 2 6*   TOTAL BILIRUBIN mg/dL 40 30*   ALK PHOS U/L 291*   ALT U/L 66   AST U/L 202*   GLUCOSE RANDOM mg/dL 122     Results from last 7 days   Lab Units 08/01/19  0522   INR  1 69*             Results from last 7 days   Lab Units 07/29/19  0108   LACTIC ACID mmol/L 2 0           * I Have Reviewed All Lab Data Listed Above  * Additional Pertinent Lab Tests Reviewed: All Labs Within Last 24 Hours Reviewed      Recent Cultures (last 7 days):     Results from last 7 days   Lab Units 07/29/19  1610 07/29/19  1155   BLOOD CULTURE  No Growth at 48 hrs  No Growth at 48 hrs         Last 24 Hours Medication List:     Current Facility-Administered Medications:  acetaminophen 650 mg Oral Q8H PRN Earlyne Tooele Jonathan, TREY    cefTRIAXone 1,000 mg Intravenous Q24H Lemon Arben, CRNP Last Rate: 1,000 mg (08/01/19 0521)   chlordiazePOXIDE 5 mg Oral Q8H [facility] Michael SPENCE MD    fluticasone 1 spray Each Nare HS Yesenia Castillo, CRNP    folic acid 1 mg Oral Daily Yesenia Castillo, CRNP    lactulose 20 g Oral BID Lemon Arben, CRNP    melatonin 6 mg Oral HS Lemon Newton, CRNP    nicotine 1 patch Transdermal Daily Lemon Arben, CRNP    ondansetron 4 mg Intravenous Q6H PRN Lemon Arben, CRNP    pantoprazole 40 mg Oral BID AC Michael SPENCE MD    prednisoLONE 40 mg Oral Daily Lemon Abren, CRNP    rifaximin 550 mg Oral Q12H [facility] Lemon Newton, CRNP    simethicone 40 mg Oral Q6H PRN Lemon Arben, CRNP    sodium chloride 1 spray Each Nare Q1H PRN Lemon Newton, CRNP    sodium chloride 75 mL/hr Intravenous Continuous Lemon Arben, CRNP Last Rate: 75 mL/hr (08/01/19 1327)   thiamine 100 mg Oral Daily Lemon Newton, CRNP         Today, Patient Was Seen By: Lexi Delaney MD    ** Please Note: Dictation voice to text software may have been used in the creation of this document   **

## 2019-08-01 NOTE — ASSESSMENT & PLAN NOTE
Transaminitis noted likely due to severe alcoholic hepatitis  Currently on prednisolone 40 mg daily for total started course  Continue to trend LFTs and INR    Daily

## 2019-08-01 NOTE — PROGRESS NOTES
GI Progress Note - Vita Bustillos 47 y o  male MRN: [id number]    Unit/Bed#: -Derrick Encounter: [id number]    Subjective:  He reports diarrhea today  He was trying to the bathroom and then when he was in his she had bright red blood dripping out of his rectum  He has abdominal pain  He does not understand why he keeps being bothered and woken up  Objective:     Vitals: Blood pressure 110/74, pulse 99, temperature (!) 97 3 °F (36 3 °C), temperature source Oral, resp  rate 18, height 6' 6" (1 981 m), weight 110 kg (242 lb 8 1 oz), SpO2 97 %  ,Body mass index is 28 02 kg/m²  Intake/Output Summary (Last 24 hours) at 8/1/2019 1528  Last data filed at 8/1/2019 1327  Gross per 24 hour   Intake 1677 5 ml   Output 2100 ml   Net -422 5 ml       Physical Exam:     General Appearance: Alert, oriented x3, no acute distress, appears chronically ill  Lungs: Clear to auscultation bilaterally, no rales or rhonchi, no labored breathing/accessory muscle use  Heart: Regular rate and rhythm, S1, S2 normal, no murmur, click, rub or gallop  Abdomen: (+) Distended, firm, hepatomegaly, BS active  Extremities: No cyanosis or LE edema    Invasive Devices     Peripheral Intravenous Line            Peripheral IV 07/31/19 Left Forearm 1 day          Line            Temporary HD Catheter 1 day                Lab Results:  Results from last 7 days   Lab Units 08/01/19  1423 08/01/19  0522  07/31/19  0617   WBC Thousand/uL  --  16 86*  --  17 23*   HEMOGLOBIN g/dL 10 5* 9 4*   < > 9 7*   HEMATOCRIT % 30 0* 26 9*  --  27 7*   PLATELETS Thousands/uL  --  249  --  230   NEUTROS PCT %  --   --   --  87*   LYMPHS PCT %  --   --   --  4*   MONOS PCT %  --   --   --  7   EOS PCT %  --   --   --  0    < > = values in this interval not displayed       Results from last 7 days   Lab Units 08/01/19  0522   POTASSIUM mmol/L 3 6   CHLORIDE mmol/L 96*   CO2 mmol/L 23   BUN mg/dL 56*   CREATININE mg/dL 6 05*   CALCIUM mg/dL 7 3*   ALK PHOS U/L 291*   ALT U/L 66   AST U/L 202*     Results from last 7 days   Lab Units 08/01/19  0522   INR  1 69*     Results from last 7 days   Lab Units 07/29/19  0108   LIPASE u/L 788*       Imaging Studies: I have personally reviewed pertinent imaging studies  Ct Abdomen Pelvis Wo Contrast  Result Date: 7/29/2019  Impression: Fatty enlarged liver with ascites  No small bowel obstruction  Workstation performed: [id number]     Xr Abdomen Obstruction Series  Result Date: 7/29/2019  Impression: No evidence of bowel obstruction or ileus  Workstation performed: [id number]     Ct Head Wo Contrast  Result Date: 7/31/2019  Impression: No acute intracranial abnormality  White matter changes which are nonspecific but are statistically most likely to represent mild microangiopathic change in this patient with a history of smoking, renal disease, and cirrhosis  Workstation performed: [id number]     Us Abdomen Complete  Result Date: 7/30/2019  Impression: Cirrhotic nodular liver  Cholelithiasis with gallbladder wall thickening and pericholecystic fluid  Correlate clinically as this could be the result of cirrhotic liver and/or cholecystitis  HIDA scan may be useful  Mild to moderate right upper quadrant perihepatic ascites  Workstation performed: [id number]     Xr Chest 1 View  Result Date: 7/30/2019  Impression: Coarsened lung markings suggesting chronic lung changes  No focal infiltrate  Workstation performed: [id number]     Xr Chest Portable Icu  Result Date: 7/30/2019  Impression: Right IJ catheter terminates in SVC  No pneumothorax   Workstation performed: [id number]       Assessment and Plan:     Abdominal Distention  Coffee Ground Emesis  - Significant abdominal distention on admission likely 2/2 hepatomegaly in the setting of acute alcoholic hepatitis, imaging is negative for bowel obstruction/significant ascites   - Protonix 40 mg IV BID  - Tylenol PRN, no more than 2 g daily  - Plan for EGD tomorrow  - NPO after midnight  - Serial abdominal exams     Alcoholic Hepatitis  - Pt was drinking 1L vodka daily up until 5 months ago but has continued to drink a 30 pack of beer weekly, last drink on Saturday 7/27  - DF >32 indicating poor prognosis, continue prednisolone 40 mg daily for treatment, 30 day course total  - LFTs remaining relatively stable, trend LFTs and INR daily  - [facility] protocol  - MVI, thiamine, folic acid  - High protein diet when not NPO  - He does not have thrombocytopenia or splenomegaly on imaging to suggest cirrhosis, CT does not show cirrhosis but US suggests this, will evaluate for evidence of portal HTN on EGD tomorrow        SOLO  - May be 2/2 HRS but there is conflicting evidence regarding the diagnosis of cirrhosis as he lacks splenomegaly, thrombocytopenia   - Nephrology following      The patient was seen and examined by Dr Jagdish Tucker

## 2019-08-01 NOTE — NURSING NOTE
Pt attempted to get oob to BR and had large amount loose watery stool on floor  Assisted by staff to [facility] and had additional stool  When pt stood for assistance off toilet,  Nurses aide observed blood falling to floor from patient  Nurse called to room and assessed patient  Pt appears to be bleeding from rectum  Josafat Franz blood dripping down legs and large amount in toilet  Brief applied to patient , patient assisted to bed observed to be weak and sob with exertion  VS taken and stable  Yojana Burn aware and coming to see pt

## 2019-08-01 NOTE — NURSING NOTE
Pt pulled off tele monitor and victor m monitor  Pt refusing to allow staff to put back on at this time  Pt ambulated to  with CNA for AM care and will attempt to reapply monitors after the same

## 2019-08-02 ENCOUNTER — TELEPHONE (OUTPATIENT)
Dept: NEPHROLOGY | Facility: CLINIC | Age: 49
End: 2019-08-02

## 2019-08-02 LAB
CHLORIDE UR-SCNC: 12 MMOL/L
CREAT UR-MCNC: 157 MG/DL
OSMOLALITY UR: 337 MMOL/KG
RHEUMATOID FACT SER QL LA: NEGATIVE
RYE IGE QN: NEGATIVE
SODIUM 24H UR-SCNC: 33 MOL/L

## 2019-08-02 NOTE — TELEPHONE ENCOUNTER
Ming Gorman's roommate called and said the patient had seen Dr Kaylah Russell in the hospital on 7/29/19 the patient was dialyzed in the hospital but [address] [place] [address] said the patient discharged himself yesterday from the hospital  [address] [place] [address] wants to schedule an appointment with Dr Kitty Johnson; however I need to know from Dr Kitty Johnson should this patient come to our office or does he need to be on dialysis at [facility]?    Brandon Bishop

## 2019-08-02 NOTE — TELEPHONE ENCOUNTER
He is still acutely ill and need to go back to the hospital   I do not have any phone number to call him back

## 2019-08-02 NOTE — UTILIZATION REVIEW
Notification of Discharge  This is a Notification of Discharge from our facility [address]  Please be advised that this patient has been discharge from our facility  Below you will find the admission and discharge date and time including the patients disposition  PRESENTATION DATE: 7/29/2019 12:51 AM  OBS ADMISSION DATE:   IP ADMISSION DATE: 7/29/19 0438   DISCHARGE DATE: 8/1/2019  9:20 PM  DISPOSITION: Left against medical advice or discontinued care Left against medical advice or discontinued care   Admission Orders listed below:  Admission Orders (From admission, onward)     Ordered        07/29/19 0438  Inpatient Admission  Once                   Please contact the UR Department if additional information is required to close this patient's authorization/case  [address]  Utilization Review Department  Phone: [phone]; Fax [phone]  [email or web address]  ATTENTION: Please call with any questions or concerns to [phone]  and carefully listen to the prompts so that you are directed to the right person  Send all requests for admission clinical reviews, approved or denied determinations and any other requests to fax [phone]   All voicemails are confidential

## 2019-08-02 NOTE — QUICK NOTE
AGAINST MEDICAL ADVICE     I was notified by RN to see and evaluate the patient  Patient has decided to leave against  medical advice because he refused to sit in a his bed any longer and was upset that he was not getting any pain meds  Although his decision was strongly discouraged, patient does have the normal mental status and adequate capacity to make this medical decision  Patient refuses to stay any longer despite being offered medication to control his pain and he expressed strongly that he wants to be discharged  The risks have been explained to the patient including worsening of his acute alcoholic hepatitis, hepatic encephalopathy, his deteriorating kidney, which he will need dialysis as early as tomorrow, chronic pain, permanent disability and death  Patient was scheduled for an endoscopy tomorrow  The benefits of this admission have also been explained to patient including the availability and close proximity of nurses, physicians, monitoring, diagnostic testing and treatment  Patient was able to understand the risks and benefits of the hospital admission, this was witnessed by nursing staff and myself  He had opportunity to ask any questions about his medical condition  The patient was treated to the extent they would allow and he knows that he may return for care anytime  I have set up STAT outpatient follow-up appointments with Dr Dai jacobsen (Nephrology) and Dr Randee Dela Cruz (gastroenterology)  I did explain that the patient should keep his phone available, because they will contact him as early as tomorrow  Patient understands are agrees with the care plan

## 2019-08-02 NOTE — TELEPHONE ENCOUNTER
Discussed with patient family including sister and friend  Advised them to take him back to the hospital as he still acutely ill with acute liver failure as well as acute kidney failure  He will likely need further dialysis but need to go back to hospital for further evaluation and management    Life-threatening situation also discussed with them

## 2019-08-02 NOTE — PROGRESS NOTES
Primary nurse advised charge nurse that patient wanted to sign self out AMA stating "I can be at home, seeing the doctor's outpatient instead of sitting here doing nothing"  Primary nurse contacted Radha Colon came to bedside to discuss and see patient  Patient unwilling to have any kind of discussion regarding staying in the hospital and wants to sign out AMA  Friend waiting outside room for patient  Peripheral IV removed  Critical care removed patient's HD cath all while SLIM at bedside  Patient's belonging from security returned to patient and patient escorted via wheelchair by charge nurse and other RN and two security guards       Betsy Asif RN  08/01/19  9:42 PM

## 2019-08-03 LAB
ALBUMIN SERPL ELPH-MCNC: 2.03 G/DL (ref 3.5–5)
ALBUMIN SERPL ELPH-MCNC: 61.6 % (ref 52–65)
ALBUMIN UR ELPH-MCNC: 33.4 %
ALPHA1 GLOB MFR UR ELPH: 12.1 %
ALPHA1 GLOB SERPL ELPH-MCNC: 0.19 G/DL (ref 0.1–0.4)
ALPHA1 GLOB SERPL ELPH-MCNC: 5.7 % (ref 2.5–5)
ALPHA2 GLOB MFR UR ELPH: 20.5 %
ALPHA2 GLOB SERPL ELPH-MCNC: 0.32 G/DL (ref 0.4–1.2)
ALPHA2 GLOB SERPL ELPH-MCNC: 9.8 % (ref 7–13)
B-GLOBULIN MFR UR ELPH: 10.8 %
BACTERIA BLD CULT: NORMAL
BACTERIA BLD CULT: NORMAL
BETA GLOB ABNORMAL SERPL ELPH-MCNC: 0.1 G/DL (ref 0.4–0.8)
BETA1 GLOB SERPL ELPH-MCNC: 3 % (ref 5–13)
BETA2 GLOB SERPL ELPH-MCNC: 5.8 % (ref 2–8)
BETA2+GAMMA GLOB SERPL ELPH-MCNC: 0.19 G/DL (ref 0.2–0.5)
GAMMA GLOB ABNORMAL SERPL ELPH-MCNC: 0.47 G/DL (ref 0.5–1.6)
GAMMA GLOB MFR UR ELPH: 23.2 %
GAMMA GLOB SERPL ELPH-MCNC: 14.1 % (ref 12–22)
IGG/ALB SER: 1.6 {RATIO} (ref 1.1–1.8)
PROT PATTERN SERPL ELPH-IMP: ABNORMAL
PROT PATTERN UR ELPH-IMP: ABNORMAL
PROT SERPL-MCNC: 3.3 G/DL (ref 6.4–8.2)
PROT UR-MCNC: 167 MG/DL

## 2019-08-03 NOTE — DISCHARGE SUMMARY
Discharge- [facility] Tressa 1970, 47 y o  male MRN: [id number]    Unit/Bed#: -[facility] Encounter: [id number]    Primary Care Provider: Camilo Barreto MD   Date and time admitted to hospital: 7/29/2019 12:51 AM       PATIENT HAD SIGN OUT AGAINST MEDICAL ADVISE    * Alcoholic hepatitis  Assessment & Plan  50-year-old male past medical history of heavy alcohol use presented with severe alcoholic hepatitis, acute kidney injury, jaundice, abdominal distention, coffee-ground emesis, was recently admitted to [facility] and had left against medical advice on Friday  Reported lasting was on Saturday on 07/27/19  Reports Remote history of substance use with heroin and crack cocaine  Admits of selling drugs as well  Reportedly drank 1 liter of vodka daily up until 5 months ago when he "went on probation " Since then has become jaundiced with decreased appetite 2/2 N/V  Not clear about what work-up was done or findings at [facility]  Alcohol discriminant  score more than 32 indicating poor prognosis  Presented with bilirubin of 44 8 currently hovering around 40 range  Presented with creatinine of 4 7 which peaked to 7 7  Patient was subsequently transferred to step-down unit on 07/30 for close monitoring while being started on temporary dialysis via right IJ temporary catheter  Tolerated to sessions of dialysis well and subsequently transferred to [facility] under [facility] services 07/31/19  Creatinine with minimal improvement and awaiting around 6 05 range  Patient had abdominal ultrasound noted for cirrhotic nodular liver  CT abdomen pelvis report noted for enlarged fatty liver but does not suggest cirrhosis  There has been conflicting finding on 2 imaging reports  Patient does have some stigmata of liver failure with scleral icterus, generalized jaundice, distended abdomen, caput medusa, spider angiomas, hepatic encephalopathy    Clinically GI does not think patient has cirrhosis since no splenomegaly, no thrombocytopenia, no symptoms of portal hypertension noted  08/1/19  On lactulose for hepatic encephalopathy there was noted earlier  Currently awake, alert, oriented x3  Chronically ill-appearing, nontoxic appearing  Patient had an episode of bowel movement with bright red blood per rectum likely due to hemorrhoidal bleed per GI  Hemoglobin stable  Started on prednisolone 40 mg daily for total 30 day course  Continue Protonix 40 mg IV b i d   Continue lactulose  Continue thiamine and folic supplement  Continue with CIWA protocol  NPO after midnight for EGD tomorrow  Continue to monitor hemoglobin and transfuse if below 7  Counseled on complete alcohol cessation  Overall prognosis guarded  Plan is to continue taper down Librium  EGD tomorrow per GI recommendation  GI following  UPON REVIEWING THE CHART IT APPEARS THAT PT  SIGNED OUT AGAINST MEDICAL ADVISE  LAST NIGHT  IT ALSO APPEARS THAT CRITICAL CARE HAD REMOVED PATIENT'S TEMPORARY HEMODIALYSIS CATHETER LAST NIGHT BEFORE HE HAD LEFT AMA  SOLO (acute kidney injury) ([facility] [address] )  Assessment & Plan  · Creatinine on admission 4 71  Worsened and peaked to 7 7  · Patient subsequently transferred to ICU for close monitoring while be started on dialysis and had temporary right IJ dialysis catheter placed on 07/30  · Patient tolerated to sessions of dialysis well and subsequently transferred to med surge floor  · Creatinine slightly improved currently at 6 05   · Initially suspected hepato renal syndrome due to cirrhosis, but there has been conflicting findings on ultrasound and CT imaging as well as since GI does not think patient has cirrhosis therefore etiology unclear for acute kidney injury  · Nephrology recommendation noted for further workup for glomerulonephritis and vasculitis  · Will monitor renal function and will continue dialysis tomorrow if renal function deteriorates  · Nephrology following      Alcohol abuse  Assessment & Plan  Currently on Librium 10 mg t i d   Currently orient x3  Last reported drink was on Saturday 07/27/19  No signs of withdrawal noted  Continue thiamine, folic acid supplement  Currently on Librium 10 mg t i d   Taper Librium to 5 mg t i d   Continue [facility] protocol  Counseled on complete alcohol abstinence       Encephalopathy  Assessment & Plan  Patient is on lactulose for hepatic encephalopathy  Having bowel movement with bright red blood per rectum  Hemoglobin has been stable  Currently awake, alert, oriented x3  Continue lactulose per GI recommendation  Anemia  Assessment & Plan  Presented with hemoglobin around 10-11 range  Was noted to have significant hemorrhoidal bleeding with bowel movements overnight and this afternoon  Currently hemoglobin stable around 9-10 range  Denies coffee-ground emesis  Had an episode of bright red blood per rectum today with bowel movement  Suspected from like;y hemorrhoidal bleed per GI  Continue monitor CBC and transfuse if below 7  NPO after midnight  Continue IV Protonix 40 mg b i d  Plan for EGD tomorrow  GI following    Tobacco abuse  Assessment & Plan  Counseled on cessation  Transaminitis  Assessment & Plan  Transaminitis noted likely due to severe alcoholic hepatitis  Currently on prednisolone 40 mg daily for total started course  Continue to trend LFTs and INR  Daily    Hematemesis  Assessment & Plan  Patient had initially presented with coffee-ground emesis  No further episode noted  Continue IV Protonix 40 mg b i d   NPO after midnight  Plan for EGD tomorrow  Hyperammonemia (HCC)  Assessment & Plan  · In the setting of alcoholic cirrhosis  · Ammonia on admission 73 noted to be 102  · Continue Lactulose PO BID  · Patient is having bowel movement  Currently oriented x3  · Continue rifaximin  · GI following      Hyponatremia  Assessment & Plan  · May be 2/2 fluid-volume status in the setting of liver failure  · Check urine Na 12, urine osmolality 336 and serum osmo 281  · Currently resolved  · Nephrology following        Discharging Physician / Practitioner: Anthony Dueñas MD  PCP: Michelle Howard MD  Admission Date:   Admission Orders (From admission, onward)     Ordered        07/29/19 0438  Inpatient Admission  Once                   Discharge Date: 08/01/19    Resolved Problems  Date Reviewed: 8/2/2019    None          Consultations During Hospital Stay:  · Gastroenterology, Nephrology    Procedures Performed:   · Hemodialysis via temporary right IJ catheter  Complications: Withdrawal symptoms  Reason for Admission:  Coffee-ground hematemesis    Hospital Course:     Lory Iglesias is a 47 y o  male patient history of alcohol use, substance use, tobacco use, who originally presented to the hospital on 7/29/2019 due to coffee-ground hematemesis  Patient was noted to have acute liver failure, acute kidney injury, coffee-ground hematemesis  Patient was seen by Nephrology who thought delaney likely due to hepatorenal syndrome  Patient was transferred to ICU for close observation while being started on hemodialysis per Nephrology recommendation  Patient had tolerated noted to sessions of dialysis well  Creatinine slightly improved  Plan was to give him a break for a day and monitor renal function and if does not improve then he was supposed to be having under sessions of dialysis tomorrow  That has been completing findings on ultrasound and CT scan regarding liver cirrhosis  GI does not think patient has liver cirrhosis  Patient still has all the signs and stigmata of acute alcoholic hepatitis with worsening liver function as well as worsening kidney function  Since GI had strongly believe that patient did not have liver cirrhosis his kidney injury cannot be explained by due to hepatorenal syndrome  Therefore nephrology recommended further workup for glomerulonephritis and vasculitis    Patient was also supposed to get upper EGD today on 2019 since he initially presented with coffee-ground emesis which had resolved  Hemoglobin had remained stable  Patient also noted to have intermittent rectal bleeding with bowel movements on lactulose was thought to be due to hemorrhoidal bleed per GI  Further plan was to have EGD done today and monitoring of liver and renal function and likely continue with dialysis  This morning I came to know that patient was not present in the hospital   Upon reviewing the chart it appears that patient had signed out against medical advise last night  I was not  in the hospital when patient had signed out against medical advice  It appears that temporary dialysis catheter was removed by critical care before patient had left  Today  I called patient's roommate Yoon Appl [phone] and discussed patient is very poor prognosis with him  Per Ming patient has no close family member  He reports that his mother had  when Amr was young, his sister had recently  and patient does not know about his father's dead or alive  I also informed Ming  that patient should get immediate medical attention and go to emergency room since patient has acute liver failure and acutely failing kidneys  Ming states that Amr is very stubborn and he feels helpless since he cannot do anything to help him  Condition at Discharge: poor     Discharge Day Visit / Exam:     * Please refer to separate progress note for these details *    Discussion with Family: I called patient's roommate Yoon Appl [phone] and discussed patient is very poor prognosis with him  Per Ming patient has no close family member  He reports that his mother had  when Amr was young, his sister had recently  and patient does not know about his father's dead or alive  I also informed Ming  that patient should get immediate medical attention and go to emergency room since patient has acute liver failure and acutely feeling kidneys   Ming states that Vita is very stubborn and he feels helpless since he cannot do anything to help him  Discharge instructions/Information to patient and family:   See after visit summary for information provided to patient and family  Provisions for Follow-Up Care:  See after visit summary for information related to follow-up care and any pertinent home health orders  Disposition:     Other: Signed out against medical advise    For Discharges to [address] [facility]:   · Not Applicable to this Patient - Not Applicable to this Patient    Planned Readmission:  Patient had sign out against medical advice  He is high risk of readmission due to above stated complex medical conditions  Discharge Statement:  I spent 20 minutes discharging the patient  This time was spent on the day of discharge  I had direct contact with the patient on the day of discharge  Greater than 50% of the total time was spent examining patient, answering all patient questions, arranging and discussing plan of care with patient as well as directly providing post-discharge instructions  Additional time then spent on discharge activities  Discharge Medications:  See after visit summary for reconciled discharge medications provided to patient and family        ** Please Note: This note has been constructed using a voice recognition system **

## 2019-08-03 NOTE — ASSESSMENT & PLAN NOTE
Currently on Librium 10 mg t i d   Currently orient x3  Last reported drink was on Saturday 07/27/19  No signs of withdrawal noted  Continue thiamine, folic acid supplement  Currently on Librium 10 mg t i d   Taper Librium to 5 mg t i d   Continue JAY protocol  Counseled on complete alcohol abstinence  Luis Eduardo Espinosa

## 2019-08-03 NOTE — ASSESSMENT & PLAN NOTE
69-year-old male past medical history of heavy alcohol use presented with severe alcoholic hepatitis, acute kidney injury, jaundice, abdominal distention, coffee-ground emesis, was recently admitted to [facility] and had left against medical advice on Friday  Reported lasting was on Saturday on 07/27/19  Reports Remote history of substance use with heroin and crack cocaine  Admits of selling drugs  Reportedly drank 1 liter of vodka daily up until 5 months ago when he "went on probation " Since then has become jaundiced with decreased appetite 2/2 N/V  Not clear about what work-up was done or findings at [facility]  Alcohol discriminant  score more than 32 indicating poor prognosis  Presented with bilirubin of 44 8 currently hovering around 40 range  Presented with creatinine of 4 7 which peaked to 7 7  Patient was subsequently transferred to step-down unit on 07/30 for close monitoring while being started on temporary dialysis via right IJ temporary catheter  Tolerated to sessions of dialysis well and subsequently transferred to [facility] under [facility] 07/31/19  Creatinine with minimal improvement and awaiting around 6 05 range  Patient had abdominal ultrasound noted for cirrhotic nodular liver  CT abdomen pelvis report noted for enlarged fatty liver but does not suggest cirrhosis  There has been conflicting finding on 2 imaging reports  Patient does have some stigmata of liver failure with scleral icterus, generalized jaundice, distended abdomen, caput medusa, spider angiomas, hepatic encephalopathy  Clinically GI does not think patient has cirrhosis since no splenomegaly, no thrombocytopenia, no symptoms of portal hypertension noted  08/1/19  On lactulose for hepatic encephalopathy there was noted earlier  Currently awake, alert, oriented x3  Chronically ill-appearing, nontoxic appearing    Patient had an episode of bowel movement with bright red blood per rectum likely due to hemorrhoidal bleed per GI  Hemoglobin stable  Started on prednisolone 40 mg daily for total 30 day course  Continue Protonix 40 mg IV b i d   Continue lactulose  Continue thiamine and folic supplement  Continue with [facility] protocol  NPO after midnight for EGD tomorrow  Continue to monitor hemoglobin and transfuse if below 7  Counseled on complete alcohol cessation  Overall prognosis guarded  GI following  PT  SIGNED OUT AGAINST MEDICAL ADVISE

## 2019-08-05 LAB
C-ANCA TITR SER IF: NORMAL TITER
CRYOGLOB SER QL 1D COLD INC: NORMAL
MYELOPEROXIDASE AB SER IA-ACNC: <9 U/ML (ref 0–9)
P-ANCA ATYPICAL TITR SER IF: NORMAL TITER
P-ANCA TITR SER IF: NORMAL TITER
PROTEINASE3 AB SER IA-ACNC: <3.5 U/ML (ref 0–3.5)